# Patient Record
Sex: FEMALE | URBAN - METROPOLITAN AREA
[De-identification: names, ages, dates, MRNs, and addresses within clinical notes are randomized per-mention and may not be internally consistent; named-entity substitution may affect disease eponyms.]

---

## 2017-03-22 ENCOUNTER — OFFICE (OUTPATIENT)
Dept: URBAN - METROPOLITAN AREA CLINIC 90 | Facility: CLINIC | Age: 72
Setting detail: OPHTHALMOLOGY
End: 2017-03-22
Payer: MEDICARE

## 2017-03-22 ENCOUNTER — RX ONLY (RX ONLY)
Age: 72
End: 2017-03-22

## 2017-03-22 DIAGNOSIS — H16.423: ICD-10-CM

## 2017-03-22 DIAGNOSIS — H40.013: ICD-10-CM

## 2017-03-22 DIAGNOSIS — H35.3121: ICD-10-CM

## 2017-03-22 DIAGNOSIS — D31.32: ICD-10-CM

## 2017-03-22 DIAGNOSIS — H26.492: ICD-10-CM

## 2017-03-22 PROCEDURE — 92250 FUNDUS PHOTOGRAPHY W/I&R: CPT | Performed by: OPHTHALMOLOGY

## 2017-03-22 PROCEDURE — 92014 COMPRE OPH EXAM EST PT 1/>: CPT | Performed by: OPHTHALMOLOGY

## 2017-03-22 ASSESSMENT — REFRACTION_MANIFEST
OD_VA2: J1
OD_VA2: 20/
OD_VA2: 20/
OD_VA3: 20/
OS_SPHERE: -0.50
OS_VA1: 20/25+/-
OS_VA2: 20/
OS_VA3: 20/
OU_VA: 20/
OD_ADD: +2.50
OD_VA1: 20/
OS_VA3: 20/
OD_CYLINDER: -0.50
OU_VA: 20/
OD_VA1: 20/
OD_VA3: 20/
OS_VA2: 20/
OS_VA1: 20/
OD_VA3: 20/
OU_VA: 20/
OS_VA2: J1
OS_ADD: +2.50
OS_VA1: 20/
OS_AXIS: 025
OD_SPHERE: -0.25
OD_VA1: 20/20-1
OS_VA3: 20/
OS_CYLINDER: -1.00
OD_AXIS: 150

## 2017-03-22 ASSESSMENT — VISUAL ACUITY
OS_BCVA: 20/25-2
OD_BCVA: 20/30-2

## 2017-03-22 ASSESSMENT — REFRACTION_CURRENTRX
OD_OVR_VA: 20/
OS_OVR_VA: 20/
OD_OVR_VA: 20/
OS_OVR_VA: 20/
OS_OVR_VA: 20/
OD_OVR_VA: 20/

## 2017-03-22 ASSESSMENT — CONFRONTATIONAL VISUAL FIELD TEST (CVF)
OD_FINDINGS: FULL
OS_FINDINGS: FULL

## 2017-03-22 ASSESSMENT — SPHEQUIV_DERIVED
OD_SPHEQUIV: -0.5
OS_SPHEQUIV: -1
OD_SPHEQUIV: -0.25
OS_SPHEQUIV: -0.75

## 2017-03-22 ASSESSMENT — REFRACTION_AUTOREFRACTION
OD_SPHERE: 0.00
OD_CYLINDER: -0.50
OS_CYLINDER: -1.50
OD_AXIS: 144
OS_SPHERE: 0.00
OS_AXIS: 35

## 2017-03-22 ASSESSMENT — VASCULARIZATION
OD_VASCULARIZATION: NASAL PANNUS
OS_VASCULARIZATION: NASAL PANNUS

## 2017-09-27 ENCOUNTER — OFFICE (OUTPATIENT)
Dept: URBAN - METROPOLITAN AREA CLINIC 90 | Facility: CLINIC | Age: 72
Setting detail: OPHTHALMOLOGY
End: 2017-09-27
Payer: MEDICARE

## 2017-09-27 DIAGNOSIS — H40.013: ICD-10-CM

## 2017-09-27 DIAGNOSIS — H35.373: ICD-10-CM

## 2017-09-27 DIAGNOSIS — H35.3121: ICD-10-CM

## 2017-09-27 DIAGNOSIS — H26.492: ICD-10-CM

## 2017-09-27 DIAGNOSIS — D31.32: ICD-10-CM

## 2017-09-27 DIAGNOSIS — H16.423: ICD-10-CM

## 2017-09-27 PROCEDURE — 92014 COMPRE OPH EXAM EST PT 1/>: CPT | Performed by: OPHTHALMOLOGY

## 2017-09-27 ASSESSMENT — KERATOMETRY
OS_AXISANGLE_DEGREES: 109
OD_AXISANGLE_DEGREES: 087
OD_K1POWER_DIOPTERS: 42.75
OS_K2POWER_DIOPTERS: 45.25
METHOD_AUTO_MANUAL: AUTO
OD_K2POWER_DIOPTERS: 45.25
OS_K1POWER_DIOPTERS: 42.50

## 2017-09-27 ASSESSMENT — REFRACTION_AUTOREFRACTION
OD_CYLINDER: -0.50
OS_CYLINDER: -1.50
OD_AXIS: 162
OS_SPHERE: -0.25
OS_AXIS: 030
OD_SPHERE: -0.25

## 2017-09-27 ASSESSMENT — REFRACTION_MANIFEST
OD_AXIS: 150
OS_SPHERE: -0.50
OD_SPHERE: -0.25
OS_AXIS: 025
OU_VA: 20/
OS_VA1: 20/
OU_VA: 20/
OS_VA2: 20/
OU_VA: 20/
OD_VA1: 20/
OD_VA3: 20/
OS_VA3: 20/
OD_VA2: J1
OS_VA2: J1
OD_VA1: 20/20-1
OD_VA2: 20/
OD_VA1: 20/
OD_ADD: +2.50
OS_VA3: 20/
OS_ADD: +2.50
OS_VA3: 20/
OS_CYLINDER: -1.00
OD_VA2: 20/
OD_VA3: 20/
OS_VA2: 20/
OD_VA3: 20/
OS_VA1: 20/25+/-
OD_CYLINDER: -0.50
OS_VA1: 20/

## 2017-09-27 ASSESSMENT — VISUAL ACUITY
OS_BCVA: 20/25+2
OD_BCVA: 20/30

## 2017-09-27 ASSESSMENT — CONFRONTATIONAL VISUAL FIELD TEST (CVF)
OS_FINDINGS: FULL
OD_FINDINGS: FULL

## 2017-09-27 ASSESSMENT — REFRACTION_CURRENTRX
OS_OVR_VA: 20/
OS_OVR_VA: 20/
OD_OVR_VA: 20/
OS_OVR_VA: 20/
OD_OVR_VA: 20/
OD_OVR_VA: 20/

## 2017-09-27 ASSESSMENT — AXIALLENGTH_DERIVED
OS_AL: 23.8465
OS_AL: 23.8465
OD_AL: 23.6031
OD_AL: 23.6031

## 2017-09-27 ASSESSMENT — VASCULARIZATION
OD_VASCULARIZATION: NASAL PANNUS
OS_VASCULARIZATION: NASAL PANNUS

## 2017-09-27 ASSESSMENT — SPHEQUIV_DERIVED
OS_SPHEQUIV: -1
OD_SPHEQUIV: -0.5
OD_SPHEQUIV: -0.5
OS_SPHEQUIV: -1

## 2018-03-28 ENCOUNTER — OFFICE (OUTPATIENT)
Dept: URBAN - METROPOLITAN AREA CLINIC 90 | Facility: CLINIC | Age: 73
Setting detail: OPHTHALMOLOGY
End: 2018-03-28
Payer: MEDICARE

## 2018-03-28 DIAGNOSIS — H35.3121: ICD-10-CM

## 2018-03-28 DIAGNOSIS — H40.013: ICD-10-CM

## 2018-03-28 DIAGNOSIS — H26.492: ICD-10-CM

## 2018-03-28 DIAGNOSIS — H35.379: ICD-10-CM

## 2018-03-28 DIAGNOSIS — D31.32: ICD-10-CM

## 2018-03-28 DIAGNOSIS — H16.423: ICD-10-CM

## 2018-03-28 PROCEDURE — 92014 COMPRE OPH EXAM EST PT 1/>: CPT | Performed by: OPHTHALMOLOGY

## 2018-03-28 PROCEDURE — 92134 CPTRZ OPH DX IMG PST SGM RTA: CPT | Performed by: OPHTHALMOLOGY

## 2018-03-28 ASSESSMENT — VISUAL ACUITY
OD_BCVA: 20/40-2
OS_BCVA: 20/25-1

## 2018-03-28 ASSESSMENT — REFRACTION_MANIFEST
OD_VA1: 20/20-1
OS_CYLINDER: -1.00
OS_VA1: 20/
OD_CYLINDER: -0.50
OD_VA3: 20/
OD_VA1: 20/
OD_VA3: 20/
OD_AXIS: 150
OS_VA2: J1
OS_ADD: +2.50
OD_VA2: J1
OS_VA2: 20/
OU_VA: 20/
OS_VA3: 20/
OU_VA: 20/
OU_VA: 20/
OD_ADD: +2.50
OD_SPHERE: -0.25
OS_VA3: 20/
OS_VA1: 20/25+/-
OD_VA1: 20/
OD_VA2: 20/
OS_VA3: 20/
OS_VA1: 20/
OD_VA2: 20/
OS_AXIS: 025
OS_SPHERE: -0.50
OD_VA3: 20/
OS_VA2: 20/

## 2018-03-28 ASSESSMENT — KERATOMETRY
METHOD_AUTO_MANUAL: AUTO
OS_K2POWER_DIOPTERS: 45.75
OS_AXISANGLE_DEGREES: 109
OS_K1POWER_DIOPTERS: 42.75
OD_K2POWER_DIOPTERS: 45.25
OD_AXISANGLE_DEGREES: 080
OD_K1POWER_DIOPTERS: 43.00

## 2018-03-28 ASSESSMENT — VASCULARIZATION
OS_VASCULARIZATION: NASAL PANNUS
OD_VASCULARIZATION: NASAL PANNUS

## 2018-03-28 ASSESSMENT — REFRACTION_AUTOREFRACTION
OS_SPHERE: -0.25
OS_CYLINDER: -2.00
OD_CYLINDER: -0.50
OD_SPHERE: -0.25
OD_AXIS: 149
OS_AXIS: 029

## 2018-03-28 ASSESSMENT — REFRACTION_CURRENTRX
OS_OVR_VA: 20/
OD_OVR_VA: 20/
OS_OVR_VA: 20/
OD_OVR_VA: 20/
OS_OVR_VA: 20/
OD_OVR_VA: 20/

## 2018-03-28 ASSESSMENT — AXIALLENGTH_DERIVED
OD_AL: 23.5572
OD_AL: 23.5572
OS_AL: 23.8053
OS_AL: 23.7066

## 2018-03-28 ASSESSMENT — SPHEQUIV_DERIVED
OS_SPHEQUIV: -1.25
OS_SPHEQUIV: -1
OD_SPHEQUIV: -0.5
OD_SPHEQUIV: -0.5

## 2018-03-28 ASSESSMENT — CONFRONTATIONAL VISUAL FIELD TEST (CVF)
OD_FINDINGS: FULL
OS_FINDINGS: FULL

## 2018-09-24 ENCOUNTER — OFFICE (OUTPATIENT)
Dept: URBAN - METROPOLITAN AREA CLINIC 90 | Facility: CLINIC | Age: 73
Setting detail: OPHTHALMOLOGY
End: 2018-09-24
Payer: MEDICARE

## 2018-09-24 DIAGNOSIS — H26.492: ICD-10-CM

## 2018-09-24 DIAGNOSIS — H40.013: ICD-10-CM

## 2018-09-24 DIAGNOSIS — H35.373: ICD-10-CM

## 2018-09-24 DIAGNOSIS — H52.4: ICD-10-CM

## 2018-09-24 PROCEDURE — 92012 INTRM OPH EXAM EST PATIENT: CPT | Performed by: OPHTHALMOLOGY

## 2018-09-24 PROCEDURE — 92015 DETERMINE REFRACTIVE STATE: CPT | Performed by: OPHTHALMOLOGY

## 2018-09-24 ASSESSMENT — REFRACTION_AUTOREFRACTION
OD_AXIS: 156
OS_CYLINDER: -1.75
OS_SPHERE: -0.25
OD_CYLINDER: -0.75
OS_AXIS: 031
OD_SPHERE: -0.25

## 2018-09-24 ASSESSMENT — REFRACTION_OUTSIDERX
OD_AXIS: 150
OU_VA: 20/
OS_SPHERE: -0.50
OS_VA3: 20/
OD_VA1: 20/20-1
OD_ADD: +2.50
OS_VA1: 20/25+
OS_AXIS: 352
OS_ADD: +2.75
OS_CYLINDER: -1.25
OD_VA2: 20/20(J1+)
OD_VA3: 20/
OD_CYLINDER: -0.25
OD_SPHERE: -0.50
OS_VA2: 20/20(J1+)

## 2018-09-24 ASSESSMENT — REFRACTION_MANIFEST
OS_VA2: 20/
OS_VA1: 20/25+/-
OD_ADD: +2.50
OS_VA3: 20/
OD_VA1: 20/20-1
OU_VA: 20/
OD_CYLINDER: -0.50
OD_VA2: 20/
OS_SPHERE: -0.50
OD_VA2: J1
OS_ADD: +2.50
OD_SPHERE: -0.25
OD_AXIS: 150
OS_VA2: J1
OS_CYLINDER: -1.00
OD_VA1: 20/
OD_VA3: 20/
OS_AXIS: 025
OD_VA3: 20/
OS_VA3: 20/
OS_VA1: 20/
OU_VA: 20/

## 2018-09-24 ASSESSMENT — KERATOMETRY
OD_AXISANGLE_DEGREES: 082
OS_AXISANGLE_DEGREES: 113
OD_K1POWER_DIOPTERS: 43.00
OS_K2POWER_DIOPTERS: 45.50
OD_K2POWER_DIOPTERS: 45.25
OS_K1POWER_DIOPTERS: 42.50
METHOD_AUTO_MANUAL: AUTO

## 2018-09-24 ASSESSMENT — AXIALLENGTH_DERIVED
OS_AL: 23.7997
OD_AL: 23.6058
OS_AL: 23.8493
OD_AL: 23.5572

## 2018-09-24 ASSESSMENT — SPHEQUIV_DERIVED
OD_SPHEQUIV: -0.5
OS_SPHEQUIV: -1
OD_SPHEQUIV: -0.625
OS_SPHEQUIV: -1.125

## 2018-09-24 ASSESSMENT — REFRACTION_CURRENTRX
OD_OVR_VA: 20/
OD_OVR_VA: 20/
OS_OVR_VA: 20/
OS_OVR_VA: 20/
OD_OVR_VA: 20/
OS_OVR_VA: 20/

## 2018-09-24 ASSESSMENT — VISUAL ACUITY
OD_BCVA: 20/50
OS_BCVA: 20/25

## 2018-09-24 ASSESSMENT — CONFRONTATIONAL VISUAL FIELD TEST (CVF)
OD_FINDINGS: FULL
OS_FINDINGS: FULL

## 2018-09-24 ASSESSMENT — VASCULARIZATION
OS_VASCULARIZATION: NASAL PANNUS
OD_VASCULARIZATION: NASAL PANNUS

## 2019-03-25 ENCOUNTER — OFFICE (OUTPATIENT)
Dept: URBAN - METROPOLITAN AREA CLINIC 90 | Facility: CLINIC | Age: 74
Setting detail: OPHTHALMOLOGY
End: 2019-03-25
Payer: MEDICARE

## 2019-03-25 DIAGNOSIS — H16.423: ICD-10-CM

## 2019-03-25 DIAGNOSIS — H26.492: ICD-10-CM

## 2019-03-25 DIAGNOSIS — H35.363: ICD-10-CM

## 2019-03-25 DIAGNOSIS — H40.013: ICD-10-CM

## 2019-03-25 DIAGNOSIS — H35.379: ICD-10-CM

## 2019-03-25 DIAGNOSIS — D31.32: ICD-10-CM

## 2019-03-25 DIAGNOSIS — H35.3121: ICD-10-CM

## 2019-03-25 PROCEDURE — 92250 FUNDUS PHOTOGRAPHY W/I&R: CPT | Performed by: OPHTHALMOLOGY

## 2019-03-25 PROCEDURE — 92014 COMPRE OPH EXAM EST PT 1/>: CPT | Performed by: OPHTHALMOLOGY

## 2019-03-25 ASSESSMENT — REFRACTION_AUTOREFRACTION
OD_CYLINDER: -0.50
OD_AXIS: 162
OS_AXIS: 028
OD_SPHERE: -0.25
OS_SPHERE: -0.25
OS_CYLINDER: -2.00

## 2019-03-25 ASSESSMENT — CONFRONTATIONAL VISUAL FIELD TEST (CVF)
OD_FINDINGS: FULL
OS_FINDINGS: FULL

## 2019-03-25 ASSESSMENT — KERATOMETRY
OS_AXISANGLE_DEGREES: 112
OS_K2POWER_DIOPTERS: 45.25
OD_AXISANGLE_DEGREES: 083
OD_K1POWER_DIOPTERS: 42.75
METHOD_AUTO_MANUAL: AUTO
OD_K2POWER_DIOPTERS: 45.25
OS_K1POWER_DIOPTERS: 42.50

## 2019-03-25 ASSESSMENT — REFRACTION_MANIFEST
OD_ADD: +2.50
OD_ADD: +2.50
OS_CYLINDER: -1.25
OS_VA3: 20/
OD_VA3: 20/
OU_VA: 20/
OD_VA3: 20/
OD_CYLINDER: -0.50
OS_CYLINDER: -1.00
OS_ADD: +2.75
OD_VA1: 20/20-1
OS_VA1: 20/25+/-
OS_SPHERE: -0.50
OD_AXIS: 150
OD_VA2: 20/20(J1+)
OD_SPHERE: -0.50
OS_VA1: 20/25+
OS_SPHERE: -0.50
OD_VA1: 20/20-1
OS_VA2: J1
OU_VA: 20/
OS_ADD: +2.50
OS_VA2: 20/20(J1+)
OS_AXIS: 352
OD_VA2: J1
OS_AXIS: 025
OS_VA3: 20/
OD_CYLINDER: -0.25
OD_SPHERE: -0.25
OD_AXIS: 150

## 2019-03-25 ASSESSMENT — REFRACTION_CURRENTRX
OD_OVR_VA: 20/
OS_OVR_VA: 20/
OS_OVR_VA: 20/
OD_OVR_VA: 20/
OS_OVR_VA: 20/
OD_OVR_VA: 20/

## 2019-03-25 ASSESSMENT — AXIALLENGTH_DERIVED
OS_AL: 23.9464
OD_AL: 23.6031
OD_AL: 23.6031
OS_AL: 23.8963
OS_AL: 23.8465
OD_AL: 23.6519

## 2019-03-25 ASSESSMENT — SPHEQUIV_DERIVED
OS_SPHEQUIV: -1.25
OD_SPHEQUIV: -0.5
OD_SPHEQUIV: -0.625
OD_SPHEQUIV: -0.5
OS_SPHEQUIV: -1.125
OS_SPHEQUIV: -1

## 2019-03-25 ASSESSMENT — VASCULARIZATION
OS_VASCULARIZATION: NASAL PANNUS
OD_VASCULARIZATION: NASAL PANNUS

## 2019-03-25 ASSESSMENT — VISUAL ACUITY
OD_BCVA: 20/40
OS_BCVA: 20/20-1

## 2019-08-12 ENCOUNTER — EMERGENCY (EMERGENCY)
Facility: HOSPITAL | Age: 74
LOS: 1 days | Discharge: HOME | End: 2019-08-12
Attending: EMERGENCY MEDICINE | Admitting: EMERGENCY MEDICINE
Payer: MEDICARE

## 2019-08-12 VITALS
DIASTOLIC BLOOD PRESSURE: 76 MMHG | RESPIRATION RATE: 16 BRPM | HEART RATE: 81 BPM | SYSTOLIC BLOOD PRESSURE: 139 MMHG | OXYGEN SATURATION: 97 %

## 2019-08-12 VITALS
SYSTOLIC BLOOD PRESSURE: 157 MMHG | RESPIRATION RATE: 16 BRPM | HEART RATE: 91 BPM | HEIGHT: 64 IN | DIASTOLIC BLOOD PRESSURE: 92 MMHG | WEIGHT: 113.1 LBS | OXYGEN SATURATION: 100 %

## 2019-08-12 LAB
ALBUMIN SERPL ELPH-MCNC: 4.2 G/DL — SIGNIFICANT CHANGE UP (ref 3.3–5)
ALP SERPL-CCNC: 56 U/L — SIGNIFICANT CHANGE UP (ref 40–120)
ALT FLD-CCNC: 27 U/L — SIGNIFICANT CHANGE UP (ref 10–45)
ANION GAP SERPL CALC-SCNC: 11 MMOL/L — SIGNIFICANT CHANGE UP (ref 5–17)
AST SERPL-CCNC: 29 U/L — SIGNIFICANT CHANGE UP (ref 10–40)
BASOPHILS # BLD AUTO: 0 K/UL — SIGNIFICANT CHANGE UP (ref 0–0.2)
BASOPHILS NFR BLD AUTO: 0 % — SIGNIFICANT CHANGE UP (ref 0–2)
BILIRUB SERPL-MCNC: 0.4 MG/DL — SIGNIFICANT CHANGE UP (ref 0.2–1.2)
BUN SERPL-MCNC: 21 MG/DL — SIGNIFICANT CHANGE UP (ref 7–23)
CALCIUM SERPL-MCNC: 9.4 MG/DL — SIGNIFICANT CHANGE UP (ref 8.4–10.5)
CHLORIDE SERPL-SCNC: 98 MMOL/L — SIGNIFICANT CHANGE UP (ref 96–108)
CO2 SERPL-SCNC: 24 MMOL/L — SIGNIFICANT CHANGE UP (ref 22–31)
CREAT SERPL-MCNC: 0.55 MG/DL — SIGNIFICANT CHANGE UP (ref 0.5–1.3)
EOSINOPHIL # BLD AUTO: 0 K/UL — SIGNIFICANT CHANGE UP (ref 0–0.5)
EOSINOPHIL NFR BLD AUTO: 0.2 % — SIGNIFICANT CHANGE UP (ref 0–6)
GLUCOSE SERPL-MCNC: 141 MG/DL — HIGH (ref 70–99)
HCT VFR BLD CALC: 41.9 % — SIGNIFICANT CHANGE UP (ref 34.5–45)
HGB BLD-MCNC: 13.7 G/DL — SIGNIFICANT CHANGE UP (ref 11.5–15.5)
LYMPHOCYTES # BLD AUTO: 0.5 K/UL — LOW (ref 1–3.3)
LYMPHOCYTES # BLD AUTO: 4.4 % — LOW (ref 13–44)
MCHC RBC-ENTMCNC: 31 PG — SIGNIFICANT CHANGE UP (ref 27–34)
MCHC RBC-ENTMCNC: 32.8 GM/DL — SIGNIFICANT CHANGE UP (ref 32–36)
MCV RBC AUTO: 94.7 FL — SIGNIFICANT CHANGE UP (ref 80–100)
MONOCYTES # BLD AUTO: 0.5 K/UL — SIGNIFICANT CHANGE UP (ref 0–0.9)
MONOCYTES NFR BLD AUTO: 4.5 % — SIGNIFICANT CHANGE UP (ref 2–14)
NEUTROPHILS # BLD AUTO: 9.7 K/UL — HIGH (ref 1.8–7.4)
NEUTROPHILS NFR BLD AUTO: 91 % — HIGH (ref 43–77)
PLATELET # BLD AUTO: 252 K/UL — SIGNIFICANT CHANGE UP (ref 150–400)
POTASSIUM SERPL-MCNC: 4 MMOL/L — SIGNIFICANT CHANGE UP (ref 3.5–5.3)
POTASSIUM SERPL-SCNC: 4 MMOL/L — SIGNIFICANT CHANGE UP (ref 3.5–5.3)
PROT SERPL-MCNC: 6.8 G/DL — SIGNIFICANT CHANGE UP (ref 6–8.3)
RBC # BLD: 4.42 M/UL — SIGNIFICANT CHANGE UP (ref 3.8–5.2)
RBC # FLD: 11.6 % — SIGNIFICANT CHANGE UP (ref 10.3–14.5)
SODIUM SERPL-SCNC: 133 MMOL/L — LOW (ref 135–145)
WBC # BLD: 10.7 K/UL — HIGH (ref 3.8–10.5)
WBC # FLD AUTO: 10.7 K/UL — HIGH (ref 3.8–10.5)

## 2019-08-12 PROCEDURE — 72131 CT LUMBAR SPINE W/O DYE: CPT | Mod: 26

## 2019-08-12 PROCEDURE — 70450 CT HEAD/BRAIN W/O DYE: CPT

## 2019-08-12 PROCEDURE — 96374 THER/PROPH/DIAG INJ IV PUSH: CPT | Mod: XU

## 2019-08-12 PROCEDURE — 72125 CT NECK SPINE W/O DYE: CPT

## 2019-08-12 PROCEDURE — 99284 EMERGENCY DEPT VISIT MOD MDM: CPT | Mod: 25

## 2019-08-12 PROCEDURE — 12052 INTMD RPR FACE/MM 2.6-5.0 CM: CPT

## 2019-08-12 PROCEDURE — 70450 CT HEAD/BRAIN W/O DYE: CPT | Mod: 26

## 2019-08-12 PROCEDURE — 99284 EMERGENCY DEPT VISIT MOD MDM: CPT | Mod: GC

## 2019-08-12 PROCEDURE — 80053 COMPREHEN METABOLIC PANEL: CPT

## 2019-08-12 PROCEDURE — 72131 CT LUMBAR SPINE W/O DYE: CPT

## 2019-08-12 PROCEDURE — 85027 COMPLETE CBC AUTOMATED: CPT

## 2019-08-12 PROCEDURE — 72125 CT NECK SPINE W/O DYE: CPT | Mod: 26

## 2019-08-12 RX ORDER — ONDANSETRON 8 MG/1
1 TABLET, FILM COATED ORAL
Qty: 15 | Refills: 0
Start: 2019-08-12 | End: 2019-08-16

## 2019-08-12 RX ORDER — ONDANSETRON 8 MG/1
4 TABLET, FILM COATED ORAL ONCE
Refills: 0 | Status: COMPLETED | OUTPATIENT
Start: 2019-08-12 | End: 2019-08-12

## 2019-08-12 RX ORDER — SODIUM CHLORIDE 9 MG/ML
1000 INJECTION INTRAMUSCULAR; INTRAVENOUS; SUBCUTANEOUS ONCE
Refills: 0 | Status: COMPLETED | OUTPATIENT
Start: 2019-08-12 | End: 2019-08-12

## 2019-08-12 RX ORDER — ACETAMINOPHEN 500 MG
975 TABLET ORAL ONCE
Refills: 0 | Status: COMPLETED | OUTPATIENT
Start: 2019-08-12 | End: 2019-08-12

## 2019-08-12 RX ADMIN — ONDANSETRON 4 MILLIGRAM(S): 8 TABLET, FILM COATED ORAL at 14:35

## 2019-08-12 RX ADMIN — Medication 975 MILLIGRAM(S): at 15:36

## 2019-08-12 RX ADMIN — SODIUM CHLORIDE 1000 MILLILITER(S): 9 INJECTION INTRAMUSCULAR; INTRAVENOUS; SUBCUTANEOUS at 14:23

## 2019-08-12 NOTE — ED ADULT NURSE REASSESSMENT NOTE - NS ED NURSE REASSESS COMMENT FT1
received care of pt pt ambulated to br without assistance pt feels better pt requesting plastics for r eyebrow lac.
r eye brow lac sutured. pt tolerated well. pt refused TD

## 2019-08-12 NOTE — ED PROVIDER NOTE - CLINICAL SUMMARY MEDICAL DECISION MAKING FREE TEXT BOX
74F with hx of HTN, HLD presenting after a mechanical fall yesterday. Plan - CT head and neck, adacel, laceration repair.

## 2019-08-12 NOTE — CONSULT NOTE ADULT - SUBJECTIVE AND OBJECTIVE BOX
73 yo F s/p fall with right upper eyelid/brow laceration.  No LOC.  No visual changes.  Patient requested plastics for repair.        PMH/PSH: HTN, HLD, bilateral upper blepharoplasty, bilateral breast augmentation    NKDA    Meds: statin, anti-HTN, pcn for dental work    Soc: nonsmoker, RHD    Fam: noncontrib        ROS: as per HPi and ow neg        PE:    NAD    ALert    MMM    PERRL    RIght upper eyelid/brow with 3cm laceration    Full thickness with exposed muscle    Abrasion below approximately 1cm        A/P: 73 yo F s/p fall with right upper eyelid/brow laceration.        Repaired as per procedure note    Keep steri in place until falls off    Follow up 1 week ANUPAMN        Rina Rodriguez MD    Plastic Surgery

## 2019-08-12 NOTE — ED PROVIDER NOTE - NS ED ROS FT
GENERAL: no fever, chills  HEENT: no cough, congestion, odynophagia, dysphagia  CARDIAC: no chest pain, palpitations, lightheadedness  PULM: no dyspnea, wheezing   GI: no abdominal pain, nausea, vomiting, diarrhea, constipation, melena, hematochezia  : no urinary dysuria, frequency, incontinence, hematuria  NEURO: no headache, motor weakness, sensory changes  MSK: no joint or muscle pain  SKIN: + laceration  HEME: no active bleeding, bruising

## 2019-08-12 NOTE — ED ADULT NURSE NOTE - NSIMPLEMENTINTERV_GEN_ALL_ED
Implemented All Fall with Harm Risk Interventions:  [place] to call system. Call bell, personal items and telephone within reach. Instruct patient to call for assistance. Room bathroom lighting operational. Non-slip footwear when patient is off stretcher. Physically safe environment: no spills, clutter or unnecessary equipment. Stretcher in lowest position, wheels locked, appropriate side rails in place. Provide visual cue, wrist band, yellow gown, etc. Monitor gait and stability. Monitor for mental status changes and reorient to person, place, and time. Review medications for side effects contributing to fall risk. Reinforce activity limits and safety measures with patient and family. Provide visual clues: red socks.

## 2019-08-12 NOTE — ED PROVIDER NOTE - CARE PLAN
Principal Discharge DX:	Fall Principal Discharge DX:	Facial laceration, initial encounter  Secondary Diagnosis:	Lumbar back pain

## 2019-08-12 NOTE — ED ADULT NURSE NOTE - OBJECTIVE STATEMENT
74 y.o female pmh HTN and HLD presenting to ED from pcp office by EMS for head ct s/p slip and fall last night while using the bathroom and vomiting, no loc presenting to ED with R. brow laceration and c/o lower generalized back pain. pt states she began vomiting and having diarrhea last night and while using the bathroom pt states she "slipped" off of the toilet hurting her lower back and hitting her r. brow, denies passing out. states she went to her pcp today to get her brow and back checked out and for the vomiting and pt was directed to come to ED for further evaluation and work up and for head ct. pt c/o nausea and lower generalized back discomfort upon assessment. did not take anything for the pain or discomfort. VS stable. denies any numbness, tingling, weakness, dizziness, blurred vision, fevers, chills, sick contacts or recent travel. labs and line obtained. results pending. pt pending ct scan of head. safety and fall precautions maintained. call bell at the bedside and within reach.

## 2019-08-12 NOTE — ED PROVIDER NOTE - PROGRESS NOTE DETAILS
Tong: plastics consulted - will see patiently shortly to repair laceration. Tong: lac repaired. Patient stable for discharge.

## 2019-08-12 NOTE — ED PROVIDER NOTE - OBJECTIVE STATEMENT
74F with hx of HTN, HLD presenting after a mechanical fall yesterday. Patient slipped in the bathroom and hit her head. No LOC or amnesia. No headache, neck pain. No motor or sensory changes. No ataxia. No prodromal chest pain, dyspnea, palpitations. Has laceration over right eyebrow. Also complaining of lower back pain.

## 2019-08-12 NOTE — ED PROVIDER NOTE - ATTENDING CONTRIBUTION TO CARE
Patient presenting complaining of facial pain and back pain after fall last night.  Patient with nausea and vomiting last night.  During night while vomiting fell, striking face, also injuring lower back.  Able to stand and ambulate afterwards, no loss of consciousness associated with fall.  Nausea and vomiting now resolved, tolerating PO.  Today followed up with PMD who sent patient to Emergency Department for evaluation of possible traumatic injury.  Not on blood thinners.  No sensation changes.  Denying associated syncope, chest pains, palpitations, shortness of breath, numbness, tingling and weakness.    A 14 point review of systems is negative except as in HPI or otherwise documented.    Exam:  General: Patient well appearing, vital signs within normal limits  HEENT: airway patent with moist mucous membranes, laceration above R eyebrow, no other facial trauma appreciated  Cardiac: RRR S1/S2 with strong peripheral pulses  Respiratory: lungs clear without respiratory distress  GI: abdomen soft, non tender, non distended  Neuro: CN II-XII intact, normal strength, sensation and coordination  MSK: midline lumbar back pains with normal ROM, no extremity deformity appreciated  Skin: warm, well perfused  Psych: normal mood and affect    Patient with facial injury and back pains after fall yesterday, neurologically intact, plan for screening labs given preceding vomiting to r/o electrolyte abnormality or AVI secondary to fluid losses, imaging of head, c-spine and L-spine to r/o ICH or spinal fracture, lac repair.

## 2019-08-12 NOTE — ED PROVIDER NOTE - NSFOLLOWUPINSTRUCTIONS_ED_ALL_ED_FT
Your diagnosis:    Discharge instructions:    - Please follow up with your Primary Care Doctor.    - Tylenol up to 650 mg every 8 hours as needed for pain and/or Motrin up to 600 mg every 6 hours as needed for pain. Take any prescribed medications as instructed:     - Others:    - Be sure to return to the ED if you develop new or worsening symptoms. Specific signs and symptoms to be vigilant of: fever or chills, chest pain, difficulty breathing, palpitations, loss of consciousness, headache, vision changes, slurred speech, difficulty swallowing or drooling, facial droop, weakness in the arms or legs, numbness or tingling, abdominal pain, nausea or vomiting, diarrhea, constipation, blood in the stool or urine, pain on urination, difficulty urinating. Your diagnosis: fall    Discharge instructions:    - Please follow up with your doctor to check on your laceration repair within 5-7 days of discharge.    - Tylenol up to 650 mg every 8 hours as needed for pain and/or Motrin up to 600 mg every 6 hours as needed for pain. Take any prescribed medications as instructed: ZOFRAN 4 mg every 8 hours as needed for nausea.    - Others:    - Be sure to return to the ED if you develop new or worsening symptoms. Specific signs and symptoms to be vigilant of: fever or chills, chest pain, difficulty breathing, palpitations, loss of consciousness, headache, vision changes, slurred speech, difficulty swallowing or drooling, facial droop, weakness in the arms or legs, numbness or tingling, abdominal pain, nausea or vomiting, diarrhea, constipation, blood in the stool or urine, pain on urination, difficulty urinating.

## 2019-08-12 NOTE — ED PROVIDER NOTE - PHYSICAL EXAMINATION
GENERAL: no acute distress, non-toxic appearing  HEAD: normocephalic, atraumatic  HEENT: normal conjunctiva, oral mucosa moist, neck supple  CARDIAC: regular rate and rhythm, normal S1 and S2,  no appreciable murmurs  PULM: clear to ascultation bilaterally, no crackles, rales, rhonchi, or wheezing  GI: abdomen nondistended, soft, nontender, no guarding or rebound tenderness  NEURO: alert and oriented x 3, normal speech, PERRLA, EOMI, no focal motor or sensory deficits  MSK: no visible deformities, no peripheral edema, calf tenderness/redness/swelling  SKIN: 2 cm linear laceration over right eyebrow  PSYCH: appropriate mood and affect

## 2019-11-25 ENCOUNTER — OFFICE (OUTPATIENT)
Dept: URBAN - METROPOLITAN AREA CLINIC 90 | Facility: CLINIC | Age: 74
Setting detail: OPHTHALMOLOGY
End: 2019-11-25
Payer: MEDICARE

## 2019-11-25 DIAGNOSIS — H35.3121: ICD-10-CM

## 2019-11-25 DIAGNOSIS — H26.492: ICD-10-CM

## 2019-11-25 DIAGNOSIS — H40.013: ICD-10-CM

## 2019-11-25 DIAGNOSIS — H16.423: ICD-10-CM

## 2019-11-25 DIAGNOSIS — D31.32: ICD-10-CM

## 2019-11-25 DIAGNOSIS — H35.379: ICD-10-CM

## 2019-11-25 PROCEDURE — 92083 EXTENDED VISUAL FIELD XM: CPT | Performed by: OPHTHALMOLOGY

## 2019-11-25 PROCEDURE — 92133 CPTRZD OPH DX IMG PST SGM ON: CPT | Performed by: OPHTHALMOLOGY

## 2019-11-25 PROCEDURE — 92014 COMPRE OPH EXAM EST PT 1/>: CPT | Performed by: OPHTHALMOLOGY

## 2019-11-25 ASSESSMENT — VISUAL ACUITY
OD_BCVA: 20/60+2
OS_BCVA: 20/25-2

## 2019-11-25 ASSESSMENT — SPHEQUIV_DERIVED
OD_SPHEQUIV: -0.5
OS_SPHEQUIV: -1
OD_SPHEQUIV: -0.5
OS_SPHEQUIV: -1.125
OS_SPHEQUIV: -1.125
OD_SPHEQUIV: -0.625

## 2019-11-25 ASSESSMENT — KERATOMETRY
OS_K2POWER_DIOPTERS: 45.50
OD_K1POWER_DIOPTERS: 43.00
OD_AXISANGLE_DEGREES: 080
OS_AXISANGLE_DEGREES: 115
OD_K2POWER_DIOPTERS: 44.75
METHOD_AUTO_MANUAL: AUTO
OS_K1POWER_DIOPTERS: 42.50

## 2019-11-25 ASSESSMENT — REFRACTION_MANIFEST
OD_ADD: +2.50
OD_VA3: 20/
OD_ADD: +2.50
OS_VA3: 20/
OU_VA: 20/
OD_CYLINDER: -0.25
OS_VA1: 20/25+
OS_SPHERE: -0.50
OS_ADD: +2.50
OD_SPHERE: -0.50
OD_VA1: 20/20-1
OD_SPHERE: -0.25
OS_VA2: J1
OS_CYLINDER: -1.25
OD_VA2: J1
OD_VA2: 20/20(J1+)
OS_VA3: 20/
OD_VA1: 20/20-1
OS_VA2: 20/20(J1+)
OS_CYLINDER: -1.00
OS_VA1: 20/25+/-
OS_AXIS: 352
OS_AXIS: 025
OD_AXIS: 150
OD_AXIS: 150
OD_CYLINDER: -0.50
OD_VA3: 20/
OS_ADD: +2.75
OU_VA: 20/
OS_SPHERE: -0.50

## 2019-11-25 ASSESSMENT — REFRACTION_AUTOREFRACTION
OS_AXIS: 036
OD_SPHERE: -0.25
OD_CYLINDER: -0.50
OS_CYLINDER: -1.75
OD_AXIS: 141
OS_SPHERE: -0.25

## 2019-11-25 ASSESSMENT — CONFRONTATIONAL VISUAL FIELD TEST (CVF)
OD_FINDINGS: FULL
OS_FINDINGS: FULL

## 2019-11-25 ASSESSMENT — AXIALLENGTH_DERIVED
OD_AL: 23.6981
OS_AL: 23.8493
OD_AL: 23.6491
OD_AL: 23.6491
OS_AL: 23.7997
OS_AL: 23.8493

## 2019-11-25 ASSESSMENT — VASCULARIZATION
OS_VASCULARIZATION: NASAL PANNUS
OD_VASCULARIZATION: NASAL PANNUS

## 2020-02-05 ENCOUNTER — OFFICE (OUTPATIENT)
Dept: URBAN - METROPOLITAN AREA CLINIC 90 | Facility: CLINIC | Age: 75
Setting detail: OPHTHALMOLOGY
End: 2020-02-05
Payer: MEDICARE

## 2020-02-05 DIAGNOSIS — H40.013: ICD-10-CM

## 2020-02-05 DIAGNOSIS — H35.3121: ICD-10-CM

## 2020-02-05 DIAGNOSIS — H26.492: ICD-10-CM

## 2020-02-05 DIAGNOSIS — R25.3: ICD-10-CM

## 2020-02-05 DIAGNOSIS — H35.379: ICD-10-CM

## 2020-02-05 DIAGNOSIS — D31.32: ICD-10-CM

## 2020-02-05 PROCEDURE — 92250 FUNDUS PHOTOGRAPHY W/I&R: CPT | Performed by: OPHTHALMOLOGY

## 2020-02-05 PROCEDURE — 92014 COMPRE OPH EXAM EST PT 1/>: CPT | Performed by: OPHTHALMOLOGY

## 2020-02-05 ASSESSMENT — CONFRONTATIONAL VISUAL FIELD TEST (CVF)
OS_FINDINGS: FULL
OD_FINDINGS: FULL

## 2020-02-05 ASSESSMENT — AXIALLENGTH_DERIVED
OD_AL: 23.6519
OS_AL: 23.8465
OD_AL: 23.6519
OD_AL: 23.6031
OS_AL: 23.8963
OS_AL: 23.9967

## 2020-02-05 ASSESSMENT — REFRACTION_MANIFEST
OD_VA1: 20/20-1
OS_VA2: 20/20(J1+)
OD_ADD: +2.50
OS_CYLINDER: -1.00
OD_AXIS: 150
OD_ADD: +2.50
OS_ADD: +2.50
OS_VA2: J1
OD_VA1: 20/20-1
OS_ADD: +2.75
OS_VA1: 20/25+/-
OD_VA3: 20/
OD_VA2: J1
OS_VA1: 20/25+
OU_VA: 20/
OD_AXIS: 150
OS_VA3: 20/
OS_VA3: 20/
OD_VA3: 20/
OS_AXIS: 025
OD_VA2: 20/20(J1+)
OS_SPHERE: -0.50
OD_SPHERE: -0.50
OD_CYLINDER: -0.50
OS_SPHERE: -0.50
OD_SPHERE: -0.25
OS_AXIS: 352
OS_CYLINDER: -1.25
OD_CYLINDER: -0.25
OU_VA: 20/

## 2020-02-05 ASSESSMENT — VISUAL ACUITY
OS_BCVA: 20/20
OD_BCVA: 20/50-1

## 2020-02-05 ASSESSMENT — REFRACTION_AUTOREFRACTION
OD_AXIS: 147
OS_SPHERE: -0.50
OS_CYLINDER: -1.75
OD_SPHERE: -0.25
OD_CYLINDER: -0.75
OS_AXIS: 041

## 2020-02-05 ASSESSMENT — VASCULARIZATION: OD_VASCULARIZATION: NASAL PANNUS

## 2020-02-05 ASSESSMENT — KERATOMETRY
OS_K2POWER_DIOPTERS: 45.25
OD_K1POWER_DIOPTERS: 43.00
OD_AXISANGLE_DEGREES: 083
OD_K2POWER_DIOPTERS: 45.00
OS_AXISANGLE_DEGREES: 114
OS_K1POWER_DIOPTERS: 42.50
METHOD_AUTO_MANUAL: AUTO

## 2020-02-05 ASSESSMENT — SPHEQUIV_DERIVED
OS_SPHEQUIV: -1.375
OS_SPHEQUIV: -1
OD_SPHEQUIV: -0.625
OD_SPHEQUIV: -0.5
OD_SPHEQUIV: -0.625
OS_SPHEQUIV: -1.125

## 2020-08-24 ENCOUNTER — OFFICE (OUTPATIENT)
Dept: URBAN - METROPOLITAN AREA CLINIC 90 | Facility: CLINIC | Age: 75
Setting detail: OPHTHALMOLOGY
End: 2020-08-24
Payer: MEDICARE

## 2020-08-24 DIAGNOSIS — R25.3: ICD-10-CM

## 2020-08-24 DIAGNOSIS — H43.392: ICD-10-CM

## 2020-08-24 DIAGNOSIS — H43.813: ICD-10-CM

## 2020-08-24 DIAGNOSIS — H26.492: ICD-10-CM

## 2020-08-24 DIAGNOSIS — H35.373: ICD-10-CM

## 2020-08-24 DIAGNOSIS — H35.3121: ICD-10-CM

## 2020-08-24 DIAGNOSIS — H16.421: ICD-10-CM

## 2020-08-24 DIAGNOSIS — H40.013: ICD-10-CM

## 2020-08-24 DIAGNOSIS — D31.32: ICD-10-CM

## 2020-08-24 PROCEDURE — 92133 CPTRZD OPH DX IMG PST SGM ON: CPT | Performed by: OPHTHALMOLOGY

## 2020-08-24 PROCEDURE — 92014 COMPRE OPH EXAM EST PT 1/>: CPT | Performed by: OPHTHALMOLOGY

## 2020-08-24 PROCEDURE — 92083 EXTENDED VISUAL FIELD XM: CPT | Performed by: OPHTHALMOLOGY

## 2020-08-24 ASSESSMENT — REFRACTION_AUTOREFRACTION
OS_SPHERE: -0.25
OD_CYLINDER: -0.75
OD_SPHERE: -0.25
OS_AXIS: 036
OD_AXIS: 151
OS_CYLINDER: -2.00

## 2020-08-24 ASSESSMENT — KERATOMETRY
OD_K2POWER_DIOPTERS: 45.50
METHOD_AUTO_MANUAL: AUTO
OD_K1POWER_DIOPTERS: 43.00
OS_K1POWER_DIOPTERS: 42.75
OD_AXISANGLE_DEGREES: 082
OS_AXISANGLE_DEGREES: 112
OS_K2POWER_DIOPTERS: 45.50

## 2020-08-24 ASSESSMENT — REFRACTION_MANIFEST
OS_VA2: 20/20(J1+)
OS_ADD: +2.50
OD_ADD: +2.50
OD_CYLINDER: -0.25
OD_VA2: J1
OS_SPHERE: -0.50
OD_VA1: 20/20-1
OD_ADD: +2.50
OS_ADD: +2.75
OD_AXIS: 150
OD_VA1: 20/20-1
OS_SPHERE: -0.50
OS_VA1: 20/25+/-
OS_VA2: J1
OD_SPHERE: -0.25
OS_VA1: 20/25+
OD_SPHERE: -0.50
OS_AXIS: 352
OD_VA2: 20/20(J1+)
OS_CYLINDER: -1.00
OS_AXIS: 025
OS_CYLINDER: -1.25
OD_CYLINDER: -0.50
OD_AXIS: 150

## 2020-08-24 ASSESSMENT — AXIALLENGTH_DERIVED
OS_AL: 23.753
OD_AL: 23.5115
OS_AL: 23.8522
OD_AL: 23.56
OD_AL: 23.56
OS_AL: 23.8025

## 2020-08-24 ASSESSMENT — SPHEQUIV_DERIVED
OS_SPHEQUIV: -1
OS_SPHEQUIV: -1.25
OD_SPHEQUIV: -0.625
OD_SPHEQUIV: -0.625
OS_SPHEQUIV: -1.125
OD_SPHEQUIV: -0.5

## 2020-08-24 ASSESSMENT — CONFRONTATIONAL VISUAL FIELD TEST (CVF)
OD_FINDINGS: FULL
OS_FINDINGS: FULL

## 2020-08-24 ASSESSMENT — VASCULARIZATION: OD_VASCULARIZATION: NASAL PANNUS

## 2020-08-24 ASSESSMENT — TONOMETRY: OS_IOP_MMHG: 12

## 2020-08-24 ASSESSMENT — VISUAL ACUITY
OD_BCVA: 20/40-2
OS_BCVA: 20/20-2

## 2020-11-10 ENCOUNTER — APPOINTMENT (OUTPATIENT)
Dept: ORTHOPEDIC SURGERY | Facility: CLINIC | Age: 75
End: 2020-11-10
Payer: MEDICARE

## 2020-11-10 VITALS
SYSTOLIC BLOOD PRESSURE: 169 MMHG | HEIGHT: 63 IN | DIASTOLIC BLOOD PRESSURE: 84 MMHG | OXYGEN SATURATION: 96 % | HEART RATE: 82 BPM | BODY MASS INDEX: 18.61 KG/M2 | WEIGHT: 105 LBS

## 2020-11-10 DIAGNOSIS — M25.571 PAIN IN RIGHT ANKLE AND JOINTS OF RIGHT FOOT: ICD-10-CM

## 2020-11-10 DIAGNOSIS — S93.491A SPRAIN OF OTHER LIGAMENT OF RIGHT ANKLE, INITIAL ENCOUNTER: ICD-10-CM

## 2020-11-10 PROCEDURE — 73610 X-RAY EXAM OF ANKLE: CPT | Mod: RT

## 2020-11-10 PROCEDURE — 99214 OFFICE O/P EST MOD 30 MIN: CPT

## 2020-11-10 NOTE — HISTORY OF PRESENT ILLNESS
[Sneakers] : sariah [FreeTextEntry1] : Pt presents for follow up visit with pain in her right ankle, pt evidently pt fell at home tripped over electric cord on 11/3/2020 and twisted her right ankle. Pt took Tylenol for pain with no real relief. Pt does not take Advil because she takes blood pressure medication., Pt right ankle is swollen and ecchymosis is noted.

## 2020-11-10 NOTE — DISCUSSION/SUMMARY
[de-identified] : Patient was advised of her findings.  She was placed in an ankle stirrup brace over a compression wrap and will modify her activity level accordingly she will ice the area and elevate her leg.  Follow-up in 2 weeks to check her progress.

## 2020-11-10 NOTE — PHYSICAL EXAM
[de-identified] : Physical examination of the right ankle discloses medial and lateral joint swelling with moderate ecchymosis.  Tenderness is noted on stress inversion. [de-identified] : X-rays taken of the right ankle in AP lateral and internal oblique projections are negative for fracture.  Ankle mortise remains closed

## 2020-11-10 NOTE — ED ADULT NURSE NOTE - NEURO ASSESSMENT
RN faxed a request of Ultrasound and PSA results from Dr Rudolph Rose.  Fax [phone]. [address]  [place]  [phone] [facility]

## 2020-12-04 ENCOUNTER — APPOINTMENT (OUTPATIENT)
Dept: ORTHOPEDIC SURGERY | Facility: CLINIC | Age: 75
End: 2020-12-04
Payer: MEDICARE

## 2020-12-04 VITALS — HEIGHT: 63 IN | HEART RATE: 76 BPM | BODY MASS INDEX: 18.61 KG/M2 | OXYGEN SATURATION: 97 % | WEIGHT: 105 LBS

## 2020-12-04 VITALS — SYSTOLIC BLOOD PRESSURE: 191 MMHG | DIASTOLIC BLOOD PRESSURE: 93 MMHG

## 2020-12-04 DIAGNOSIS — S93.491D SPRAIN OF OTHER LIGAMENT OF RIGHT ANKLE, SUBSEQUENT ENCOUNTER: ICD-10-CM

## 2020-12-04 PROCEDURE — 99214 OFFICE O/P EST MOD 30 MIN: CPT | Mod: 25

## 2020-12-04 PROCEDURE — 20611 DRAIN/INJ JOINT/BURSA W/US: CPT | Mod: LT

## 2020-12-04 NOTE — REVIEW OF SYSTEMS
[Arthralgia] : arthralgia [Joint Stiffness] : joint stiffness [Joint Swelling] : joint swelling [Negative] : Heme/Lymph

## 2020-12-04 NOTE — HISTORY OF PRESENT ILLNESS
[Sneakers] : sariah [FreeTextEntry1] : Pt returns for follow up for pain in her right ankle, pt is wearing a compression wrap, she is not wearing the ankle stirrup presently. pt is taking Tylenol for pain prn with  no real relief. Original injury sustained on 11/3/2020 Patient is also noting further pain discomfort to her left knee. Symptoms are worse with weightbearing activities.

## 2020-12-04 NOTE — PROCEDURE
[FreeTextEntry1] : Under sterile technique the left knee was injected with cortisone 40 mg Depo-Medrol with lidocaine\par \par A discussion took place with the patient regarding the procedure. General risks and benefits were reviewed.\par The suprapatellar region of the knee was prepped to attain a sterile field. Ethyl Chloride spray was used as a topical anesthetic. \par A 22-gauge 1-1/2 inch needle was used to inject the medication.\par The procedure was performed utilizing ultrasound guided needle placement with the Sonosite linear transducer in order to visualize and confirm the location of the needle tip and intra-articular delivery of the medication in the exact location desired to achieve maximal benefit from the medication. The images were recorded and saved.\par The injection site was sterilely dressed and the patient tolerated the procedure well, without complications.\par The patient was given post injection instructions including rest, cool pack applications, and take NSAIDs or acetaminophen. The patient was advised that if there are worsening symptoms or any associated problems, to  contact our office accordingly

## 2020-12-04 NOTE — DISCUSSION/SUMMARY
[de-identified] : The patientwill continue activity level at tolerance and will use an Ace wrap for her right ankle As far as her left knee is concerned, following her cortisone injection today the patient will rest and use ice. She may return in 3-4 weeks for consideration of a PRP injection if symptoms persist.

## 2020-12-04 NOTE — PHYSICAL EXAM
[de-identified] : Physical examination of the right ankle discloses mild soft tissue swelling and tenderness of the lateral malleolar region. Range of motion functionally intact. No acutesigns of instability or neurovascular deficits. \par examination of the left knee discloses tenderness to the medial compartment with mild crepitus

## 2021-02-26 ENCOUNTER — APPOINTMENT (OUTPATIENT)
Dept: ORTHOPEDIC SURGERY | Facility: CLINIC | Age: 76
End: 2021-02-26
Payer: MEDICARE

## 2021-02-26 VITALS
WEIGHT: 107 LBS | BODY MASS INDEX: 18.96 KG/M2 | HEART RATE: 75 BPM | OXYGEN SATURATION: 95 % | SYSTOLIC BLOOD PRESSURE: 159 MMHG | HEIGHT: 63 IN | DIASTOLIC BLOOD PRESSURE: 89 MMHG

## 2021-02-26 DIAGNOSIS — M76.51 PATELLAR TENDINITIS, RIGHT KNEE: ICD-10-CM

## 2021-02-26 PROCEDURE — 99213 OFFICE O/P EST LOW 20 MIN: CPT

## 2021-02-26 PROCEDURE — 73564 X-RAY EXAM KNEE 4 OR MORE: CPT | Mod: RT

## 2021-02-26 NOTE — DISCUSSION/SUMMARY
[de-identified] : The patient was advised of her findings and will be referred to physical therapy take OTC NSAIDs rest and use cryotherapy.  Follow-up as needed

## 2021-02-26 NOTE — HISTORY OF PRESENT ILLNESS
[Worsening] : worsening [___ yrs] : [unfilled] year(s) ago [0] : a minimum pain level of 0/10 [4] : a maximum pain level of 4/10 [Intermit.] : ~He/She~ states the symptoms seem to be intermittent [Walking] : worsened by walking [Knee Flexion] : worsened with knee flexion [Ice] : relieved by ice [Rest] : relieved by rest [de-identified] : Pt presents for follow up visit, today pt is having pain in her right knee, pt does not recall any specific injury, she says she has been " walking a lot".  There is no buckling, occasional clicking. There is no edema noted.  [de-identified] : certain movements [de-identified] : Tylenol

## 2021-02-26 NOTE — PHYSICAL EXAM
[de-identified] : Physical examination of the right knee discloses tenderness to palpation at the superior pole of the patella midline.  Increased pain with resisted knee extension.  Range of motion to the right knee otherwise stable and nontender from 0 to 125 degrees flexion. [de-identified] : X-rays taken of the right knee and AP lateral skyline and open notch views disclose mild degenerative arthritic changes with periarticular patellofemoral osteophytes.

## 2021-04-05 ENCOUNTER — OFFICE (OUTPATIENT)
Dept: URBAN - METROPOLITAN AREA CLINIC 90 | Facility: CLINIC | Age: 76
Setting detail: OPHTHALMOLOGY
End: 2021-04-05
Payer: MEDICARE

## 2021-04-05 DIAGNOSIS — H35.373: ICD-10-CM

## 2021-04-05 DIAGNOSIS — D31.32: ICD-10-CM

## 2021-04-05 DIAGNOSIS — H16.421: ICD-10-CM

## 2021-04-05 DIAGNOSIS — H26.493: ICD-10-CM

## 2021-04-05 DIAGNOSIS — H43.813: ICD-10-CM

## 2021-04-05 DIAGNOSIS — H35.3121: ICD-10-CM

## 2021-04-05 DIAGNOSIS — H40.013: ICD-10-CM

## 2021-04-05 DIAGNOSIS — H18.452: ICD-10-CM

## 2021-04-05 DIAGNOSIS — H43.392: ICD-10-CM

## 2021-04-05 PROCEDURE — 92014 COMPRE OPH EXAM EST PT 1/>: CPT | Performed by: OPHTHALMOLOGY

## 2021-04-05 PROCEDURE — 92250 FUNDUS PHOTOGRAPHY W/I&R: CPT | Performed by: OPHTHALMOLOGY

## 2021-04-05 ASSESSMENT — TONOMETRY
OD_IOP_MMHG: 14
OS_IOP_MMHG: 14

## 2021-04-05 ASSESSMENT — VISUAL ACUITY
OD_BCVA: 20/40-2
OS_BCVA: 20/25+2

## 2021-04-05 ASSESSMENT — KERATOMETRY
OD_K1POWER_DIOPTERS: 42.75
OS_K2POWER_DIOPTERS: 45.75
METHOD_AUTO_MANUAL: AUTO
OS_AXISANGLE_DEGREES: 111
OS_K1POWER_DIOPTERS: 42.50
OD_K2POWER_DIOPTERS: 45.75
OD_AXISANGLE_DEGREES: 079

## 2021-04-05 ASSESSMENT — SPHEQUIV_DERIVED
OD_SPHEQUIV: -0.5
OD_SPHEQUIV: -0.625
OS_SPHEQUIV: -1.125
OS_SPHEQUIV: -1
OD_SPHEQUIV: -0.625
OS_SPHEQUIV: -1.75

## 2021-04-05 ASSESSMENT — AXIALLENGTH_DERIVED
OS_AL: 23.753
OD_AL: 23.56
OD_AL: 23.56
OS_AL: 24.053
OD_AL: 23.5115
OS_AL: 23.8025

## 2021-04-05 ASSESSMENT — VASCULARIZATION: OD_VASCULARIZATION: NASAL PANNUS

## 2021-04-05 ASSESSMENT — REFRACTION_MANIFEST
OD_VA2: 20/20(J1+)
OD_AXIS: 150
OS_ADD: +2.50
OD_VA2: J1
OS_AXIS: 025
OS_VA2: 20/20(J1+)
OD_SPHERE: -0.50
OD_VA1: 20/20-1
OS_AXIS: 352
OS_SPHERE: -0.50
OD_ADD: +2.50
OS_CYLINDER: -1.25
OS_VA1: 20/25+
OD_CYLINDER: -0.25
OD_CYLINDER: -0.50
OD_AXIS: 150
OD_ADD: +2.50
OD_SPHERE: -0.25
OS_SPHERE: -0.50
OS_ADD: +2.75
OD_VA1: 20/20-1
OS_CYLINDER: -1.00
OS_VA1: 20/25+/-
OS_VA2: J1

## 2021-04-05 ASSESSMENT — CONFRONTATIONAL VISUAL FIELD TEST (CVF)
OD_FINDINGS: FULL
OS_FINDINGS: FULL

## 2021-04-05 ASSESSMENT — REFRACTION_AUTOREFRACTION
OD_SPHERE: -0.25
OS_SPHERE: -0.75
OD_AXIS: 149
OS_CYLINDER: -2.00
OS_AXIS: 028
OD_CYLINDER: -0.75

## 2021-06-01 ENCOUNTER — APPOINTMENT (OUTPATIENT)
Dept: ORTHOPEDIC SURGERY | Facility: CLINIC | Age: 76
End: 2021-06-01
Payer: MEDICARE

## 2021-06-01 VITALS
OXYGEN SATURATION: 95 % | SYSTOLIC BLOOD PRESSURE: 171 MMHG | DIASTOLIC BLOOD PRESSURE: 84 MMHG | WEIGHT: 107 LBS | HEIGHT: 63 IN | BODY MASS INDEX: 18.96 KG/M2 | HEART RATE: 74 BPM

## 2021-06-01 PROCEDURE — 20611 DRAIN/INJ JOINT/BURSA W/US: CPT | Mod: LT

## 2021-06-01 PROCEDURE — 99213 OFFICE O/P EST LOW 20 MIN: CPT | Mod: 25

## 2021-06-01 RX ORDER — AMLODIPINE AND VALSARTAN 5; 320 MG/1; MG/1
5-320 TABLET, FILM COATED ORAL
Qty: 30 | Refills: 0 | Status: ACTIVE | COMMUNITY
Start: 2021-05-21

## 2021-06-01 RX ORDER — VALSARTAN AND HYDROCHLOROTHIAZIDE 160; 25 MG/1; MG/1
160-25 TABLET, FILM COATED ORAL
Qty: 90 | Refills: 0 | Status: ACTIVE | COMMUNITY
Start: 2021-01-19

## 2021-06-01 RX ORDER — VALSARTAN 80 MG/1
80 TABLET, COATED ORAL
Qty: 90 | Refills: 0 | Status: ACTIVE | COMMUNITY
Start: 2020-12-08

## 2021-06-01 RX ORDER — DOXYCYCLINE HYCLATE 20 MG/1
20 TABLET ORAL
Qty: 180 | Refills: 0 | Status: ACTIVE | COMMUNITY
Start: 2021-05-18

## 2021-06-01 RX ORDER — CHLORHEXIDINE GLUCONATE, 0.12% ORAL RINSE 1.2 MG/ML
0.12 SOLUTION DENTAL
Qty: 473 | Refills: 0 | Status: ACTIVE | COMMUNITY
Start: 2021-03-25

## 2021-06-01 RX ORDER — AMLODIPINE AND VALSARTAN 5; 160 MG/1; MG/1
5-160 TABLET, FILM COATED ORAL
Qty: 90 | Refills: 0 | Status: ACTIVE | COMMUNITY
Start: 2021-04-21

## 2021-06-01 NOTE — PHYSICAL EXAM
[de-identified] : Physical examination of the left knee discloses medial sided joint line tenderness to palpation.  She also experiencing discomfort to the posterior fossa functional range of motion intact.  Mild left knee effusion.

## 2021-06-01 NOTE — PROCEDURE
[de-identified] : Under sterile technique using ultrasound-guided needle placement the left knee was injected with Depo-Medrol 40 mg with lidocaine.\par \par A discussion took place with the patient regarding the procedure. General risks and benefits were reviewed.\par The suprapatellar region of the knee was prepped to attain a sterile field. Ethyl Chloride spray was used as a topical anesthetic. \par A 22-gauge 1-1/2 inch needle was used to inject the medication.\par The procedure was performed utilizing ultrasound guided needle placement with the Sonosite linear transducer in order to visualize and confirm the location of the needle tip and intra-articular delivery of the medication in the exact location desired to achieve maximal benefit from the medication. The images were recorded and saved.\par The injection site was sterilely dressed and the patient tolerated the procedure well, without complications.\par The patient was given post injection instructions including rest, cool pack applications, and take NSAIDs or acetaminophen. The patient was advised that if there are worsening symptoms or any associated problems, to contact our office accordingly

## 2021-06-01 NOTE — HISTORY OF PRESENT ILLNESS
[Worsening] : worsening [___ yrs] : [unfilled] year(s) ago [5] : a minimum pain level of 5/10 [6] : a maximum pain level of 6/10 [Walking] : worsened by walking [Knee Flexion] : worsened with knee flexion [Heat] : relieved by heat [Ice] : relieved by ice [Physical Therapy] : relieved by physical therapy [Rest] : relieved by rest [de-identified] : Pt returns for follow up for her bilateral knee pain, left knee worse. Pt is attending physical therapy weekly.  Pt states there is no snapping clicking buckling. Pt has  taken Tylenol prn for pain,  Pt states she is walking a mile a day. Pt states she "would like an injection to the left knee" Hx reveals injection to the left knee 12/2020 [de-identified] : certain movements, stairs [de-identified] : tylenol

## 2021-06-01 NOTE — DISCUSSION/SUMMARY
[de-identified] : Following the patient's cortisone injection to her left knee she will rest use ice take acetaminophen as needed and follow-up accordingly.

## 2021-06-28 ENCOUNTER — APPOINTMENT (OUTPATIENT)
Dept: ORTHOPEDIC SURGERY | Facility: CLINIC | Age: 76
End: 2021-06-28
Payer: MEDICARE

## 2021-06-28 VITALS
WEIGHT: 103 LBS | HEIGHT: 63 IN | SYSTOLIC BLOOD PRESSURE: 143 MMHG | HEART RATE: 83 BPM | DIASTOLIC BLOOD PRESSURE: 79 MMHG | BODY MASS INDEX: 18.25 KG/M2

## 2021-06-28 DIAGNOSIS — Z78.9 OTHER SPECIFIED HEALTH STATUS: ICD-10-CM

## 2021-06-28 DIAGNOSIS — Z86.79 PERSONAL HISTORY OF OTHER DISEASES OF THE CIRCULATORY SYSTEM: ICD-10-CM

## 2021-06-28 DIAGNOSIS — Z82.49 FAMILY HISTORY OF ISCHEMIC HEART DISEASE AND OTHER DISEASES OF THE CIRCULATORY SYSTEM: ICD-10-CM

## 2021-06-28 PROCEDURE — 72070 X-RAY EXAM THORAC SPINE 2VWS: CPT

## 2021-06-28 PROCEDURE — 72110 X-RAY EXAM L-2 SPINE 4/>VWS: CPT

## 2021-06-28 PROCEDURE — 99213 OFFICE O/P EST LOW 20 MIN: CPT

## 2021-06-28 NOTE — HISTORY OF PRESENT ILLNESS
[de-identified] : This is a 76-year-old female here today for evaluation of her low back pain.  She denies any radicular pain down her legs.  She denies any weakness.  She has been dealing with this right-sided low back pain for 2+ years.  She denies any bowel bladder issues.  She denies any saddle anesthesia.

## 2021-06-28 NOTE — ASSESSMENT
[FreeTextEntry1] : This is a 76-year-old female with a history of osteoarthritis and osteoporosis who presents today with low back pain.  She has an MRI from 2020 which does show spinal stenosis at L4-L5.  Furthermore she does have spondylolisthesis at L4-L5.  Currently however the patient would like to continue to pursue conservative management.  Given the fact that she has no red flag findings on her clinical exam I do think that she will do well with a course of physical therapy and Tylenol.  I will have her follow-up in 6 to 8 weeks for repeat clinical evaluation.  Encouraged her to follow-up sooner if she has any new or worsening symptoms.

## 2021-06-28 NOTE — PHYSICAL EXAM
[de-identified] : Lumbar Physical Exam\par \par Gait - Normal\par \par Station - Normal\par \par Sagittal balance - Normal\par \par Compensatory mechanism? - None\par \par Heel walk - Normal\par \par Toe walk - Normal\par \par Reflexes\par Patellar - normal\par Gastroc - normal\par Clonus - No\par \par Hip Exam - Normal\par \par Straight leg raise - none\par \par Pulses - 2+ dp/pt\par \par Range of motion - normal\par \par Sensation \par Sensation intact to light touch in L1, L2, L3, L4, L5 and S1 dermatomes bilaterally\par \par Motor\par 	IP	Quad	HS	TA	Gastroc	EHL\par Right	5/5	5/5	5/5	5/5	5/5	5/5\par Left	5/5	5/5	5/5	5/5	5/5	5/5 [de-identified] : Lumbar radiographs\par L4-L5 spondylolisthesis noted\par Facet arthropathy noted\par Disc degeneration\par Degenerative scoliosis noted\par \par Thoracic radiographs\par Thoracic spondylosis noted

## 2021-10-25 ENCOUNTER — APPOINTMENT (OUTPATIENT)
Dept: ORTHOPEDIC SURGERY | Facility: CLINIC | Age: 76
End: 2021-10-25
Payer: MEDICARE

## 2021-10-25 VITALS
SYSTOLIC BLOOD PRESSURE: 151 MMHG | OXYGEN SATURATION: 98 % | WEIGHT: 103 LBS | BODY MASS INDEX: 18.25 KG/M2 | DIASTOLIC BLOOD PRESSURE: 84 MMHG | HEIGHT: 63 IN | HEART RATE: 62 BPM

## 2021-10-25 PROCEDURE — 99214 OFFICE O/P EST MOD 30 MIN: CPT

## 2021-10-25 NOTE — PHYSICAL EXAM
[de-identified] : Lumbar Physical Exam\par \par Gait - Normal\par \par Station - Normal\par \par Sagittal balance - Normal\par \par Compensatory mechanism? - None\par \par Heel walk - Normal\par \par Toe walk - Normal\par \par Reflexes\par Patellar - normal\par Gastroc - normal\par Clonus - No\par \par Hip Exam - Normal\par \par Straight leg raise - none\par \par Pulses - 2+ dp/pt\par \par Range of motion - normal\par \par Sensation \par Sensation intact to light touch in L1, L2, L3, L4, L5 and S1 dermatomes bilaterally\par \par Motor\par 	IP	Quad	HS	TA	Gastroc	EHL\par Right	5/5	5/5	5/5	5/5	5/5	5/5\par Left	5/5	5/5	5/5	5/5	5/5	5/5 [de-identified] : Lumbar radiographs\par L4-L5 spondylolisthesis noted\par Facet arthropathy noted\par Disc degeneration\par Degenerative scoliosis noted\par \par Thoracic radiographs\par Thoracic spondylosis noted

## 2021-10-25 NOTE — ASSESSMENT
[FreeTextEntry1] : I had a lengthy discussion with the patient in regards to treatment plan and diagnosis. There are no red flag findings on imaging nor are there any red flag findings on clinical exam.  Therefore we will proceed with a course of conservative treatment.  This would include physical therapy/home exercise program, Tylenol, NSAIDs as medically indicated.  The patient will follow up with me in approximately 6 to 8 weeks.  I encouraged the patient to follow-up sooner if there are any new or worsening symptoms.\par \par At her next visit she would like to have a new set of x-rays which I think is reasonable.  She may be a candidate for an injection at her next visit as well.

## 2021-10-25 NOTE — HISTORY OF PRESENT ILLNESS
[Stable] : stable [___ yrs] : [unfilled] year(s) ago [2] : a current pain level of 2/10 [8] : a maximum pain level of 8/10 [Sitting] : sitting [Daily] : ~He/She~ states the symptoms seem to be occuring daily [de-identified] : 76 year old female patient presents with complains of chronic lower back pain with no radiation. Patient reports she has been experiencing this pain for 2+ years. Patient reports experiencing pain with position changes from a sitting to a standing position. Patient denies pain with walking and is able to walk 15+ blocks with no pain. Patient denies taking any prescribed or OTC medications at this time for the pain. Patient denies any weakness in the legs or instability. Patient denies any bowel or bladder issues. \par \par  [Ataxia] : no ataxia [Incontinence] : no incontinence [Urinary Ret.] : no urinary retention

## 2021-10-27 ENCOUNTER — OFFICE (OUTPATIENT)
Dept: URBAN - METROPOLITAN AREA CLINIC 90 | Facility: CLINIC | Age: 76
Setting detail: OPHTHALMOLOGY
End: 2021-10-27
Payer: MEDICARE

## 2021-10-27 DIAGNOSIS — D31.32: ICD-10-CM

## 2021-10-27 DIAGNOSIS — H26.493: ICD-10-CM

## 2021-10-27 DIAGNOSIS — H18.452: ICD-10-CM

## 2021-10-27 DIAGNOSIS — H35.373: ICD-10-CM

## 2021-10-27 DIAGNOSIS — H40.013: ICD-10-CM

## 2021-10-27 DIAGNOSIS — H43.392: ICD-10-CM

## 2021-10-27 DIAGNOSIS — H16.421: ICD-10-CM

## 2021-10-27 DIAGNOSIS — H35.3121: ICD-10-CM

## 2021-10-27 DIAGNOSIS — H43.813: ICD-10-CM

## 2021-10-27 PROCEDURE — 92014 COMPRE OPH EXAM EST PT 1/>: CPT | Performed by: OPHTHALMOLOGY

## 2021-10-27 PROCEDURE — 92083 EXTENDED VISUAL FIELD XM: CPT | Performed by: OPHTHALMOLOGY

## 2021-10-27 PROCEDURE — 92133 CPTRZD OPH DX IMG PST SGM ON: CPT | Performed by: OPHTHALMOLOGY

## 2021-10-27 ASSESSMENT — REFRACTION_MANIFEST
OS_VA1: 20/25+
OD_AXIS: 150
OS_AXIS: 352
OS_VA2: J1
OS_ADD: +2.50
OD_AXIS: 150
OD_VA2: J1
OS_VA1: 20/25+/-
OD_VA1: 20/20-1
OD_ADD: +2.50
OD_VA2: 20/20(J1+)
OS_SPHERE: -0.50
OD_CYLINDER: -0.25
OD_SPHERE: -0.25
OS_CYLINDER: -1.25
OS_ADD: +2.75
OD_CYLINDER: -0.50
OS_CYLINDER: -1.00
OS_AXIS: 025
OD_ADD: +2.50
OS_VA2: 20/20(J1+)
OD_SPHERE: -0.50
OD_VA1: 20/20-1
OS_SPHERE: -0.50

## 2021-10-27 ASSESSMENT — TONOMETRY
OD_IOP_MMHG: 11
OS_IOP_MMHG: 11

## 2021-10-27 ASSESSMENT — REFRACTION_AUTOREFRACTION
OD_CYLINDER: -0.75
OS_SPHERE: -0.75
OS_CYLINDER: -2.00
OS_AXIS: 028
OD_SPHERE: -0.25
OD_AXIS: 149

## 2021-10-27 ASSESSMENT — SPHEQUIV_DERIVED
OD_SPHEQUIV: -0.625
OD_SPHEQUIV: -0.625
OS_SPHEQUIV: -1.125
OS_SPHEQUIV: -1
OS_SPHEQUIV: -1.75
OD_SPHEQUIV: -0.5

## 2021-10-27 ASSESSMENT — KERATOMETRY
OS_AXISANGLE_DEGREES: 111
METHOD_AUTO_MANUAL: AUTO
OD_AXISANGLE_DEGREES: 079
OD_K2POWER_DIOPTERS: 45.75
OS_K1POWER_DIOPTERS: 42.50
OD_K1POWER_DIOPTERS: 42.75
OS_K2POWER_DIOPTERS: 45.75

## 2021-10-27 ASSESSMENT — AXIALLENGTH_DERIVED
OS_AL: 24.053
OD_AL: 23.56
OD_AL: 23.56
OS_AL: 23.753
OD_AL: 23.5115
OS_AL: 23.8025

## 2021-10-27 ASSESSMENT — VASCULARIZATION: OD_VASCULARIZATION: NASAL PANNUS

## 2021-10-27 ASSESSMENT — VISUAL ACUITY
OD_BCVA: 20/70+2
OS_BCVA: 20/25-2

## 2021-10-27 ASSESSMENT — CONFRONTATIONAL VISUAL FIELD TEST (CVF)
OS_FINDINGS: FULL
OD_FINDINGS: FULL

## 2021-11-10 ENCOUNTER — APPOINTMENT (OUTPATIENT)
Dept: ORTHOPEDIC SURGERY | Facility: CLINIC | Age: 76
End: 2021-11-10
Payer: MEDICARE

## 2021-11-10 VITALS
SYSTOLIC BLOOD PRESSURE: 177 MMHG | HEART RATE: 72 BPM | DIASTOLIC BLOOD PRESSURE: 77 MMHG | OXYGEN SATURATION: 99 % | WEIGHT: 103 LBS | HEIGHT: 63 IN | BODY MASS INDEX: 18.25 KG/M2

## 2021-11-10 PROCEDURE — 99214 OFFICE O/P EST MOD 30 MIN: CPT

## 2021-11-10 RX ORDER — LIDOCAINE 5% 700 MG/1
5 PATCH TOPICAL
Qty: 12 | Refills: 1 | Status: ACTIVE | COMMUNITY
Start: 2021-11-10 | End: 1900-01-01

## 2021-11-10 NOTE — HISTORY OF PRESENT ILLNESS
[de-identified] : Please the patient states that she has had significant symptoms since last time I have seen her.  She did have severe right lower extremity symptoms.  She has pain with movement right lateral thigh.  she denies any bowel bladder issues.  She denies any saddle anesthesia.  \par \par 10/25/21\par This is a 76-year-old female here today for evaluation of her low back pain.  She denies any radicular pain down her legs.  She denies any weakness.  She has been dealing with this right-sided low back pain for 2+ years.  She denies any bowel bladder issues.  She denies any saddle anesthesia.

## 2021-11-10 NOTE — PHYSICAL EXAM
[de-identified] : Lumbar Physical Exam\par \par Gait - Normal\par \par Station - Normal\par \par Sagittal balance - Normal\par \par Compensatory mechanism? - None\par \par Heel walk - Normal\par \par Toe walk - Normal\par \par Reflexes\par Patellar - normal\par Gastroc - normal\par Clonus - No\par \par Hip Exam - Normal\par \par Straight leg raise - none\par \par Pulses - 2+ dp/pt\par \par Range of motion - normal\par \par Sensation \par Sensation intact to light touch in L1, L2, L3, L4, L5 and S1 dermatomes bilaterally\par \par Motor\par 	IP	Quad	HS	TA	Gastroc	EHL\par Right	5/5	5/5	5/5	5/5	5/5	5/5\par Left	5/5	5/5	5/5	5/5	5/5	5/5 [de-identified] : Lumbar radiographs\par L4-L5 spondylolisthesis noted\par Facet arthropathy noted\par Disc degeneration\par Degenerative scoliosis noted\par \par Thoracic radiographs\par Thoracic spondylosis noted

## 2021-11-10 NOTE — ASSESSMENT
[FreeTextEntry1] : I had a lengthy discussion with the patient in regards to their treatment plan and diagnosis.  Their symptoms have persisted despite the conservative management they have attempted thus far.  As a result I would like to proceed with a lumbar MRI.  In tandem with this they should begin physical therapy/home therapy program.  The patient can take Tylenol/NSAIDs as needed for pain control if medically able to.  I will have the patient follow-up in 3 to 4 weeks for repeat clinical evaluation.  I encouraged them to follow-up sooner if their symptoms worsen or change in any way.\par \par The patient has tried the following treatments:\par Activity modification          +\par Ice/Compression                  +\par Braces                                     -\par NSAIDS                                   +\par Physical Therapy                   +\par \par Please note that over 6 weeks of time was spent in the conservative care mentioned above.  Please note that over 30 minutes of time was spent in care of this patient which includes previsit preparation, in person visit, post visit documentation.

## 2021-12-06 ENCOUNTER — APPOINTMENT (OUTPATIENT)
Dept: ORTHOPEDIC SURGERY | Facility: CLINIC | Age: 76
End: 2021-12-06

## 2021-12-13 ENCOUNTER — APPOINTMENT (OUTPATIENT)
Dept: ORTHOPEDIC SURGERY | Facility: CLINIC | Age: 76
End: 2021-12-13

## 2021-12-27 ENCOUNTER — APPOINTMENT (OUTPATIENT)
Dept: ORTHOPEDIC SURGERY | Facility: CLINIC | Age: 76
End: 2021-12-27
Payer: MEDICARE

## 2021-12-27 PROCEDURE — 99214 OFFICE O/P EST MOD 30 MIN: CPT

## 2021-12-27 NOTE — ASSESSMENT
[FreeTextEntry1] : I had a lengthy discussion with the patient in regards to treatment plan and diagnosis. There are no red flag findings on imaging nor are there any red flag findings on clinical exam.  Therefore we will proceed with a course of conservative treatment.  This would include physical therapy/home exercise program, Tylenol, NSAIDs as medically indicated.  The patient will follow up with me in approximately 12 weeks.  I encouraged the patient to follow-up sooner if there are any new or worsening symptoms.  She will consider an injection but at this point she will hold off for now.

## 2021-12-27 NOTE — HISTORY OF PRESENT ILLNESS
[de-identified] : Today the patient states that she is still having the same symptoms as last time.  She states the majority of her pain is in her low back today.  She denies any severe right lower extremity symptoms.  She did state that a few weeks ago she did have significant right lower extremity symptoms but not today.  She denies any bowel bladder issues.  She denies any saddle anesthesia.\par \par 11/10/21\par Today the patient states that she has had significant symptoms since last time I have seen her.  She did have severe right lower extremity symptoms.  She has pain with movement right lateral thigh.  she denies any bowel bladder issues.  She denies any saddle anesthesia.  \par \par 10/25/21\par This is a 76-year-old female here today for evaluation of her low back pain.  She denies any radicular pain down her legs.  She denies any weakness.  She has been dealing with this right-sided low back pain for 2+ years.  She denies any bowel bladder issues.  She denies any saddle anesthesia.

## 2021-12-27 NOTE — PHYSICAL EXAM
[de-identified] : Lumbar Physical Exam\par \par Gait - Normal\par \par Station - Normal\par \par Sagittal balance - Normal\par \par Compensatory mechanism? - None\par \par Heel walk - Normal\par \par Toe walk - Normal\par \par Reflexes\par Patellar - normal\par Gastroc - normal\par Clonus - No\par \par Hip Exam - Normal\par \par Straight leg raise - none\par \par Pulses - 2+ dp/pt\par \par Range of motion - normal\par \par Sensation \par Sensation intact to light touch in L1, L2, L3, L4, L5 and S1 dermatomes bilaterally\par \par Motor\par 	IP	Quad	HS	TA	Gastroc	EHL\par Right	5/5	5/5	5/5	5/5	5/5	5/5\par Left	5/5	5/5	5/5	5/5	5/5	5/5 [de-identified] : Lumbar radiographs\par L4-L5 spondylolisthesis noted\par Facet arthropathy noted\par Disc degeneration\par Degenerative scoliosis noted\par \par Thoracic radiographs\par Thoracic spondylosis noted\par \par Lumbar MRI reviewed\par There is some lateral recess stenosis at L4-L5

## 2022-01-13 ENCOUNTER — APPOINTMENT (OUTPATIENT)
Dept: ORTHOPEDIC SURGERY | Facility: CLINIC | Age: 77
End: 2022-01-13
Payer: MEDICARE

## 2022-01-13 VITALS
HEIGHT: 63 IN | HEART RATE: 75 BPM | SYSTOLIC BLOOD PRESSURE: 161 MMHG | BODY MASS INDEX: 18.25 KG/M2 | WEIGHT: 103 LBS | DIASTOLIC BLOOD PRESSURE: 84 MMHG | OXYGEN SATURATION: 95 %

## 2022-01-13 PROCEDURE — 20610 DRAIN/INJ JOINT/BURSA W/O US: CPT | Mod: RT

## 2022-01-13 PROCEDURE — 99214 OFFICE O/P EST MOD 30 MIN: CPT | Mod: 57

## 2022-01-13 NOTE — PROCEDURE
[Injection] : Injection [Right] : of the right [Inflammation] : Inflammation [Risk] : risk [Benefits] : benefits [Alcohol] : Alcohol [Lateral] : lateral [18] : an 18-gauge [1% Lidocaine___(mL)] : [unfilled] mL of 1% Lidocaine [Methylpred. 40mg/mL___(mL)] : [unfilled] mL 40mg/mL methylprednisolone [Bandage Applied] : a bandage [Tolerated Well] : The patient tolerated the procedure well [None] : none

## 2022-01-13 NOTE — ASSESSMENT
[FreeTextEntry1] : I had a lengthy discussion with the patient in regards to treatment plan and diagnosis. There are no red flag findings on imaging nor are there any red flag findings on clinical exam.  Therefore we will proceed with a course of conservative treatment.  This would include physical therapy/home exercise program, Tylenol, NSAIDs as medically indicated.  The patient will follow up with me in approximately 12 weeks.  I encouraged the patient to follow-up sooner if there are any new or worsening symptoms.  The patient will also follow-up with  for possible epidural on her back.  She knows to call at any time if her symptoms worsen or change in any way.

## 2022-01-13 NOTE — PHYSICAL EXAM
[de-identified] : Lumbar Physical Exam\par \par Gait - Normal\par \par Station - Normal\par \par Sagittal balance - Normal\par \par Compensatory mechanism? - None\par \par Heel walk - Normal\par \par Toe walk - Normal\par \par Reflexes\par Patellar - normal\par Gastroc - normal\par Clonus - No\par \par Hip Exam -decreased range of motion\par \par Straight leg raise - none\par \par Pulses - 2+ dp/pt\par \par Range of motion - normal\par \par Sensation \par Sensation intact to light touch in L1, L2, L3, L4, L5 and S1 dermatomes bilaterally\par \par Motor\par 	IP	Quad	HS	TA	Gastroc	EHL\par Right	5/5	5/5	5/5	5/5	5/5	5/5\par Left	5/5	5/5	5/5	5/5	5/5	5/5\par \par Point tenderness over the right lateral thigh [de-identified] : Lumbar radiographs\par L4-L5 spondylolisthesis noted\par Facet arthropathy noted\par Disc degeneration\par Degenerative scoliosis noted\par \par Thoracic radiographs\par Thoracic spondylosis noted\par \par Lumbar MRI reviewed\par There is some lateral recess stenosis at L4-L5

## 2022-01-13 NOTE — HISTORY OF PRESENT ILLNESS
[de-identified] : Today the patient states that she is doing well overall.  She walks approximately 1 mile a day.  She does have some right low back pain.  She also has some right lateral thigh pain.  She denies any bowel bladder issues.  She denies any saddle anesthesia.  She is here today to discuss her treatment options.\par \par 12/27/21\par Today the patient states that she is still having the same symptoms as last time.  She states the majority of her pain is in her low back today.  She denies any severe right lower extremity symptoms.  She did state that a few weeks ago she did have significant right lower extremity symptoms but not today.  She denies any bowel bladder issues.  She denies any saddle anesthesia.\par \par 11/10/21\par Today the patient states that she has had significant symptoms since last time I have seen her.  She did have severe right lower extremity symptoms.  She has pain with movement right lateral thigh.  she denies any bowel bladder issues.  She denies any saddle anesthesia.  \par \par 10/25/21\par This is a 76-year-old female here today for evaluation of her low back pain.  She denies any radicular pain down her legs.  She denies any weakness.  She has been dealing with this right-sided low back pain for 2+ years.  She denies any bowel bladder issues.  She denies any saddle anesthesia.

## 2022-02-09 ENCOUNTER — APPOINTMENT (OUTPATIENT)
Dept: ORTHOPEDIC SURGERY | Facility: CLINIC | Age: 77
End: 2022-02-09
Payer: SELF-PAY

## 2022-02-09 VITALS
DIASTOLIC BLOOD PRESSURE: 77 MMHG | OXYGEN SATURATION: 97 % | WEIGHT: 103 LBS | HEART RATE: 67 BPM | HEIGHT: 63 IN | BODY MASS INDEX: 18.25 KG/M2 | SYSTOLIC BLOOD PRESSURE: 172 MMHG

## 2022-02-09 PROCEDURE — 99214 OFFICE O/P EST MOD 30 MIN: CPT | Mod: 25

## 2022-02-09 PROCEDURE — 20610 DRAIN/INJ JOINT/BURSA W/O US: CPT

## 2022-02-09 NOTE — ASSESSMENT
[FreeTextEntry1] : I discussed with the patient her current symptoms.  At this point we will continue to treat her symptomatically with anti-inflammatories and Tylenol as medically indicated.  I will have her follow-up in approximately 2 to 3 months for repeat clinical evaluation.  I did encourage her to reach out to me at any point if she has any new or worsening symptoms as well.

## 2022-02-09 NOTE — PROCEDURE
[Injection] : Injection [Bilateral] : of bilateral [Knee Joint] : knee joint [Inflammation] : Inflammation [Patient] : patient [Risk] : risk [Benefits] : benefits [Alcohol] : Alcohol [Lateral] : lateral [20] : a 20-gauge [1% Lidocaine___(mL)] : [unfilled] mL of 1% Lidocaine [Methylpred. 40mg/mL___(mL)] : [unfilled] mL of 40mg/mL methylprednisolone [Bandage Applied] : a bandage [Tolerated Well] : The patient tolerated the procedure well [None] : none

## 2022-02-09 NOTE — HISTORY OF PRESENT ILLNESS
[de-identified] : Today the patient states that overall she is doing well.  She states that the right lateral thigh pain that she had previously has gotten better with the greater trochanteric bursa injection she received at her last visit.  Currently she is dealing with knee pain.  She has known knee osteoarthritis.  She denies any bowel bladder issues.  She denies any saddle anesthesia.  She denies any focal areas of weakness or numbness.\par \par 01/13/22\par Today the patient states that she is doing well overall.  She walks approximately 1 mile a day.  She does have some right low back pain.  She also has some right lateral thigh pain.  She denies any bowel bladder issues.  She denies any saddle anesthesia.  She is here today to discuss her treatment options.\par \par 12/27/21\par Today the patient states that she is still having the same symptoms as last time.  She states the majority of her pain is in her low back today.  She denies any severe right lower extremity symptoms.  She did state that a few weeks ago she did have significant right lower extremity symptoms but not today.  She denies any bowel bladder issues.  She denies any saddle anesthesia.\par \par 11/10/21\par Today the patient states that she has had significant symptoms since last time I have seen her.  She did have severe right lower extremity symptoms.  She has pain with movement right lateral thigh.  she denies any bowel bladder issues.  She denies any saddle anesthesia.  \par \par 10/25/21\par This is a 76-year-old female here today for evaluation of her low back pain.  She denies any radicular pain down her legs.  She denies any weakness.  She has been dealing with this right-sided low back pain for 2+ years.  She denies any bowel bladder issues.  She denies any saddle anesthesia.

## 2022-02-09 NOTE — PHYSICAL EXAM
[de-identified] : Lumbar Physical Exam\par \par Gait - Normal\par \par Station - Normal\par \par Sagittal balance - Normal\par \par Compensatory mechanism? - None\par \par Heel walk - Normal\par \par Toe walk - Normal\par \par Reflexes\par Patellar - normal\par Gastroc - normal\par Clonus - No\par \par Hip Exam -decreased range of motion\par \par Straight leg raise - none\par \par Pulses - 2+ dp/pt\par \par Range of motion - normal\par \par Sensation \par Sensation intact to light touch in L1, L2, L3, L4, L5 and S1 dermatomes bilaterally\par \par Motor\par 	IP	Quad	HS	TA	Gastroc	EHL\par Right	5/5	5/5	5/5	5/5	5/5	5/5\par Left	5/5	5/5	5/5	5/5	5/5	5/5\par \par Bilateral knees examined\par Pain with extremes of range of motion\par ACL PCL LCL MCL with solid endpoints\par Joint line tenderness noted\par Negative Lenin's test [de-identified] : Lumbar radiographs\par L4-L5 spondylolisthesis noted\par Facet arthropathy noted\par Disc degeneration\par Degenerative scoliosis noted\par \par Thoracic radiographs\par Thoracic spondylosis noted\par \par Lumbar MRI reviewed\par There is some lateral recess stenosis at L4-L5

## 2022-04-06 ENCOUNTER — OFFICE (OUTPATIENT)
Dept: URBAN - METROPOLITAN AREA CLINIC 90 | Facility: CLINIC | Age: 77
Setting detail: OPHTHALMOLOGY
End: 2022-04-06
Payer: MEDICARE

## 2022-04-06 DIAGNOSIS — H43.392: ICD-10-CM

## 2022-04-06 DIAGNOSIS — D31.32: ICD-10-CM

## 2022-04-06 DIAGNOSIS — H35.373: ICD-10-CM

## 2022-04-06 DIAGNOSIS — H16.421: ICD-10-CM

## 2022-04-06 DIAGNOSIS — H43.813: ICD-10-CM

## 2022-04-06 DIAGNOSIS — H40.013: ICD-10-CM

## 2022-04-06 DIAGNOSIS — H26.493: ICD-10-CM

## 2022-04-06 DIAGNOSIS — H18.452: ICD-10-CM

## 2022-04-06 DIAGNOSIS — H35.3121: ICD-10-CM

## 2022-04-06 PROCEDURE — 92134 CPTRZ OPH DX IMG PST SGM RTA: CPT | Performed by: OPHTHALMOLOGY

## 2022-04-06 PROCEDURE — 92014 COMPRE OPH EXAM EST PT 1/>: CPT | Performed by: OPHTHALMOLOGY

## 2022-04-06 ASSESSMENT — REFRACTION_AUTOREFRACTION
OS_AXIS: 028
OS_SPHERE: -0.75
OD_CYLINDER: -0.75
OD_SPHERE: -0.25
OS_CYLINDER: -2.00
OD_AXIS: 149

## 2022-04-06 ASSESSMENT — REFRACTION_MANIFEST
OD_CYLINDER: -0.25
OS_SPHERE: -0.50
OS_VA2: J1
OD_SPHERE: -0.50
OS_VA1: 20/25+
OD_SPHERE: -0.25
OD_ADD: +2.50
OS_CYLINDER: -1.25
OS_AXIS: 352
OD_AXIS: 150
OD_VA1: 20/20-1
OS_VA2: 20/20(J1+)
OD_VA2: J1
OD_ADD: +2.50
OS_ADD: +2.50
OD_VA2: 20/20(J1+)
OS_SPHERE: -0.50
OS_VA1: 20/25+/-
OD_CYLINDER: -0.50
OD_VA1: 20/20-1
OS_CYLINDER: -1.00
OS_ADD: +2.75
OD_AXIS: 150
OS_AXIS: 025

## 2022-04-06 ASSESSMENT — TONOMETRY
OS_IOP_MMHG: 12
OD_IOP_MMHG: 12

## 2022-04-06 ASSESSMENT — AXIALLENGTH_DERIVED
OS_AL: 23.8025
OD_AL: 23.5115
OD_AL: 23.56
OD_AL: 23.56
OS_AL: 24.053
OS_AL: 23.753

## 2022-04-06 ASSESSMENT — KERATOMETRY
OS_K1POWER_DIOPTERS: 42.50
OS_AXISANGLE_DEGREES: 111
METHOD_AUTO_MANUAL: AUTO
OD_K2POWER_DIOPTERS: 45.75
OD_AXISANGLE_DEGREES: 079
OS_K2POWER_DIOPTERS: 45.75
OD_K1POWER_DIOPTERS: 42.75

## 2022-04-06 ASSESSMENT — VISUAL ACUITY
OD_BCVA: 20/50-2
OS_BCVA: 20/25-2

## 2022-04-06 ASSESSMENT — SPHEQUIV_DERIVED
OD_SPHEQUIV: -0.5
OS_SPHEQUIV: -1.75
OS_SPHEQUIV: -1.125
OD_SPHEQUIV: -0.625
OS_SPHEQUIV: -1
OD_SPHEQUIV: -0.625

## 2022-04-06 ASSESSMENT — CONFRONTATIONAL VISUAL FIELD TEST (CVF)
OS_FINDINGS: FULL
OD_FINDINGS: FULL

## 2022-04-06 ASSESSMENT — VASCULARIZATION: OD_VASCULARIZATION: NASAL PANNUS

## 2022-04-13 ENCOUNTER — APPOINTMENT (OUTPATIENT)
Dept: ORTHOPEDIC SURGERY | Facility: CLINIC | Age: 77
End: 2022-04-13
Payer: MEDICARE

## 2022-04-13 VITALS
WEIGHT: 103 LBS | DIASTOLIC BLOOD PRESSURE: 75 MMHG | SYSTOLIC BLOOD PRESSURE: 130 MMHG | HEIGHT: 63 IN | BODY MASS INDEX: 18.25 KG/M2

## 2022-04-13 PROCEDURE — 20610 DRAIN/INJ JOINT/BURSA W/O US: CPT

## 2022-04-13 PROCEDURE — 99214 OFFICE O/P EST MOD 30 MIN: CPT | Mod: 57

## 2022-04-13 NOTE — ASSESSMENT
[FreeTextEntry1] : This is a 77-year-old female that is here today for evaluation of her back and leg symptoms.  At this point we will proceed with continued physical therapy.  She does have some areas of stenosis on her MRI.  I would like her to follow-up with a pain management physician for evaluation of a possible epidural steroid injection.  I will have her follow-up in 6 weeks time.  I did encourage her to reach out to me at any time she has any questions in regards to her care

## 2022-04-13 NOTE — HISTORY OF PRESENT ILLNESS
[de-identified] : Today the patient states that overall her symptoms are unchanged.  She states that after her last injection she had significant improvement in her right thigh, lateral thigh pain.  She denies any bowel bladder issues.  She denies any saddle anesthesia.  She is still able to walk fairly well.  She is here today to discuss neck steps and treatment.  In particular she would like to pursue further physical therapy and another shot today.\par \par 02/09/22\par Today the patient states that overall she is doing well.  She states that the right lateral thigh pain that she had previously has gotten better with the greater trochanteric bursa injection she received at her last visit.  Currently she is dealing with knee pain.  She has known knee osteoarthritis.  She denies any bowel bladder issues.  She denies any saddle anesthesia.  She denies any focal areas of weakness or numbness.\par \par 01/13/22\par Today the patient states that she is doing well overall.  She walks approximately 1 mile a day.  She does have some right low back pain.  She also has some right lateral thigh pain.  She denies any bowel bladder issues.  She denies any saddle anesthesia.  She is here today to discuss her treatment options.\par \par 12/27/21\par Today the patient states that she is still having the same symptoms as last time.  She states the majority of her pain is in her low back today.  She denies any severe right lower extremity symptoms.  She did state that a few weeks ago she did have significant right lower extremity symptoms but not today.  She denies any bowel bladder issues.  She denies any saddle anesthesia.\par \par 11/10/21\par Today the patient states that she has had significant symptoms since last time I have seen her.  She did have severe right lower extremity symptoms.  She has pain with movement right lateral thigh.  she denies any bowel bladder issues.  She denies any saddle anesthesia.  \par \par 10/25/21\par This is a 76-year-old female here today for evaluation of her low back pain.  She denies any radicular pain down her legs.  She denies any weakness.  She has been dealing with this right-sided low back pain for 2+ years.  She denies any bowel bladder issues.  She denies any saddle anesthesia.

## 2022-04-13 NOTE — PHYSICAL EXAM
[de-identified] : Lumbar Physical Exam\par \par Gait - Normal\par \par Station - Normal\par \par Sagittal balance - Normal\par \par Compensatory mechanism? - None\par \par Heel walk - Normal\par \par Toe walk - Normal\par \par Reflexes\par Patellar - normal\par Gastroc - normal\par Clonus - No\par \par Hip Exam -decreased range of motion\par \par Straight leg raise - none\par \par Pulses - 2+ dp/pt\par \par Range of motion - normal\par \par Sensation \par Sensation intact to light touch in L1, L2, L3, L4, L5 and S1 dermatomes bilaterally\par \par Motor\par 	IP	Quad	HS	TA	Gastroc	EHL\par Right	5/5	5/5	5/5	5/5	5/5	5/5\par Left	5/5	5/5	5/5	5/5	5/5	5/5\par \par Bilateral knees examined\par Pain with extremes of range of motion\par ACL PCL LCL MCL with solid endpoints\par Joint line tenderness noted\par Negative Lenin's test [de-identified] : Lumbar radiographs\par L4-L5 spondylolisthesis noted\par Facet arthropathy noted\par Disc degeneration\par Degenerative scoliosis noted\par \par Thoracic radiographs\par Thoracic spondylosis noted\par \par Lumbar MRI reviewed\par There is some lateral recess stenosis at L4-L5

## 2022-04-13 NOTE — PROCEDURE
[Injection] : Injection [Right] : of the right [Greater Trochanter] : greater trochanteric bursa [Inflammation] : Inflammation [Risk] : risk [Benefits] : benefits [Alcohol] : Alcohol [Ethyl Chloride Spray] : ethyl chloride spray was used as a topical anesthetic [Lateral] : lateral [18] : an 18-gauge [1% Lidocaine___(mL)] : [unfilled] mL of 1% Lidocaine [Methylpred. 40mg/mL___(mL)] : [unfilled] mL 40mg/mL methylprednisolone [Bandage Applied] : a bandage [Tolerated Well] : The patient tolerated the procedure well [None] : none

## 2022-06-20 NOTE — REVIEW OF SYSTEMS
[FreeTextEntry1] : AEEG June 2021: infrequent generalized spikes, no seizure\par Brain MRI 2/2/21: normal [Negative] : Heme/Lymph [FreeTextEntry9] : Back and leg symptoms

## 2022-08-24 ENCOUNTER — APPOINTMENT (OUTPATIENT)
Dept: ORTHOPEDIC SURGERY | Facility: CLINIC | Age: 77
End: 2022-08-24

## 2022-09-12 ENCOUNTER — APPOINTMENT (OUTPATIENT)
Dept: ORTHOPEDIC SURGERY | Facility: CLINIC | Age: 77
End: 2022-09-12

## 2022-09-12 PROCEDURE — 99214 OFFICE O/P EST MOD 30 MIN: CPT | Mod: 57

## 2022-09-12 PROCEDURE — 20610 DRAIN/INJ JOINT/BURSA W/O US: CPT

## 2022-09-12 NOTE — PROCEDURE
[Injection] : Injection [Bilateral] : of bilateral [Knee Joint] : knee joint [Inflammation] : Inflammation [Patient] : patient [Risk] : risk [Benefits] : benefits [Alcohol] : Alcohol [Ethyl Chloride Spray] : ethyl chloride spray was used as a topical anesthetic [Medial] : medial [25] : a 25-gauge [1% Lidocaine___(mL)] : [unfilled] mL of 1% Lidocaine [Methylpred. 40mg/mL___(mL)] : [unfilled] mL of 40mg/mL methylprednisolone [Bandage Applied] : a bandage [Tolerated Well] : The patient tolerated the procedure well

## 2022-09-12 NOTE — HISTORY OF PRESENT ILLNESS
[de-identified] : Today the patient states that she is still dealing with some back pain but her radicular pain has improved after injections.  She denies any real radicular type pain today.  She does describe significant bilateral knee pain which is chronic in nature.  At her last appointment I did give her injections which she does feel helped her symptoms substantially.  She denies any bowel bladder issues.  She denies any saddle anesthesia.\par \par 04/13/22\par Today the patient states that overall her symptoms are unchanged.  She states that after her last injection she had significant improvement in her right thigh, lateral thigh pain.  She denies any bowel bladder issues.  She denies any saddle anesthesia.  She is still able to walk fairly well.  She is here today to discuss neck steps and treatment.  In particular she would like to pursue further physical therapy and another shot today.\par \par 02/09/22\par Today the patient states that overall she is doing well.  She states that the right lateral thigh pain that she had previously has gotten better with the greater trochanteric bursa injection she received at her last visit.  Currently she is dealing with knee pain.  She has known knee osteoarthritis.  She denies any bowel bladder issues.  She denies any saddle anesthesia.  She denies any focal areas of weakness or numbness.\par \par 01/13/22\par Today the patient states that she is doing well overall.  She walks approximately 1 mile a day.  She does have some right low back pain.  She also has some right lateral thigh pain.  She denies any bowel bladder issues.  She denies any saddle anesthesia.  She is here today to discuss her treatment options.\par \par 12/27/21\par Today the patient states that she is still having the same symptoms as last time.  She states the majority of her pain is in her low back today.  She denies any severe right lower extremity symptoms.  She did state that a few weeks ago she did have significant right lower extremity symptoms but not today.  She denies any bowel bladder issues.  She denies any saddle anesthesia.\par \par 11/10/21\par Today the patient states that she has had significant symptoms since last time I have seen her.  She did have severe right lower extremity symptoms.  She has pain with movement right lateral thigh.  she denies any bowel bladder issues.  She denies any saddle anesthesia.  \par \par 10/25/21\par This is a 76-year-old female here today for evaluation of her low back pain.  She denies any radicular pain down her legs.  She denies any weakness.  She has been dealing with this right-sided low back pain for 2+ years.  She denies any bowel bladder issues.  She denies any saddle anesthesia.

## 2022-09-12 NOTE — REASON FOR VISIT
[Follow-Up Visit] : a follow-up visit for [Back Pain] : back pain [FreeTextEntry2] : Bilateral knee pain

## 2022-09-12 NOTE — PHYSICAL EXAM
[de-identified] : Lumbar Physical Exam\par \par Gait - Normal\par \par Station - Normal\par \par Sagittal balance - Normal\par \par Compensatory mechanism? - None\par \par Heel walk - Normal\par \par Toe walk - Normal\par \par Reflexes\par Patellar - normal\par Gastroc - normal\par Clonus - No\par \par Hip Exam -decreased range of motion\par \par Straight leg raise - none\par \par Pulses - 2+ dp/pt\par \par Range of motion - normal\par \par Sensation \par Sensation intact to light touch in L1, L2, L3, L4, L5 and S1 dermatomes bilaterally\par \par Motor\par 	IP	Quad	HS	TA	Gastroc	EHL\par Right	5/5	5/5	5/5	5/5	5/5	5/5\par Left	5/5	5/5	5/5	5/5	5/5	5/5\par \par Bilateral knees examined\par Pain with extremes of range of motion\par ACL PCL LCL MCL with solid endpoints\par Joint line tenderness noted\par Negative Lenin's test [de-identified] : Lumbar radiographs\par L4-L5 spondylolisthesis noted\par Facet arthropathy noted\par Disc degeneration\par Degenerative scoliosis noted\par \par Thoracic radiographs\par Thoracic spondylosis noted\par \par Lumbar MRI reviewed\par There is some lateral recess stenosis at L4-L5

## 2022-09-21 ENCOUNTER — OFFICE (OUTPATIENT)
Dept: URBAN - METROPOLITAN AREA CLINIC 90 | Facility: CLINIC | Age: 77
Setting detail: OPHTHALMOLOGY
End: 2022-09-21
Payer: MEDICARE

## 2022-09-21 DIAGNOSIS — H43.813: ICD-10-CM

## 2022-09-21 DIAGNOSIS — H16.421: ICD-10-CM

## 2022-09-21 DIAGNOSIS — H35.373: ICD-10-CM

## 2022-09-21 DIAGNOSIS — H43.392: ICD-10-CM

## 2022-09-21 DIAGNOSIS — H26.493: ICD-10-CM

## 2022-09-21 DIAGNOSIS — D31.32: ICD-10-CM

## 2022-09-21 DIAGNOSIS — H40.013: ICD-10-CM

## 2022-09-21 DIAGNOSIS — H35.3121: ICD-10-CM

## 2022-09-21 DIAGNOSIS — H18.452: ICD-10-CM

## 2022-09-21 PROCEDURE — 92083 EXTENDED VISUAL FIELD XM: CPT | Performed by: OPHTHALMOLOGY

## 2022-09-21 PROCEDURE — 92014 COMPRE OPH EXAM EST PT 1/>: CPT | Performed by: OPHTHALMOLOGY

## 2022-09-21 PROCEDURE — 92250 FUNDUS PHOTOGRAPHY W/I&R: CPT | Performed by: OPHTHALMOLOGY

## 2022-09-21 ASSESSMENT — REFRACTION_MANIFEST
OS_ADD: +2.50
OS_CYLINDER: -1.25
OD_AXIS: 150
OD_VA2: 20/20(J1+)
OD_VA1: 20/20-1
OD_CYLINDER: -0.25
OD_VA2: J1
OS_AXIS: 025
OS_VA2: 20/20(J1+)
OS_ADD: +2.75
OS_CYLINDER: -1.00
OS_SPHERE: -0.50
OS_SPHERE: -0.50
OS_VA1: 20/25+/-
OD_AXIS: 150
OD_SPHERE: -0.50
OD_VA1: 20/20-1
OS_AXIS: 352
OS_VA2: J1
OD_CYLINDER: -0.50
OD_ADD: +2.50
OD_ADD: +2.50
OD_SPHERE: -0.25
OS_VA1: 20/25+

## 2022-09-21 ASSESSMENT — SPHEQUIV_DERIVED
OS_SPHEQUIV: -1
OS_SPHEQUIV: -0.875
OD_SPHEQUIV: -0.5
OD_SPHEQUIV: -0.625
OD_SPHEQUIV: -0.5
OS_SPHEQUIV: -1.125

## 2022-09-21 ASSESSMENT — KERATOMETRY
OS_K2POWER_DIOPTERS: 45.25
OS_AXISANGLE_DEGREES: 111
METHOD_AUTO_MANUAL: AUTO
OS_K1POWER_DIOPTERS: 42.75
OD_K2POWER_DIOPTERS: 45.00
OD_AXISANGLE_DEGREES: 075
OD_K1POWER_DIOPTERS: 42.75

## 2022-09-21 ASSESSMENT — AXIALLENGTH_DERIVED
OD_AL: 23.6491
OS_AL: 23.8493
OD_AL: 23.6981
OS_AL: 23.7997
OS_AL: 23.7502
OD_AL: 23.6491

## 2022-09-21 ASSESSMENT — REFRACTION_AUTOREFRACTION
OD_CYLINDER: -0.50
OS_AXIS: 051
OS_SPHERE: -0.25
OD_AXIS: 131
OD_SPHERE: -0.25
OS_CYLINDER: -1.25

## 2022-09-21 ASSESSMENT — CONFRONTATIONAL VISUAL FIELD TEST (CVF)
OS_FINDINGS: FULL
OD_FINDINGS: FULL

## 2022-09-21 ASSESSMENT — TONOMETRY: OS_IOP_MMHG: 10

## 2022-09-21 ASSESSMENT — VASCULARIZATION: OD_VASCULARIZATION: NASAL PANNUS

## 2022-09-21 ASSESSMENT — VISUAL ACUITY
OD_BCVA: 20/50-2
OS_BCVA: 20/25+1

## 2022-10-06 ENCOUNTER — APPOINTMENT (OUTPATIENT)
Dept: ORTHOPEDIC SURGERY | Facility: CLINIC | Age: 77
End: 2022-10-06

## 2022-10-06 VITALS
HEART RATE: 81 BPM | SYSTOLIC BLOOD PRESSURE: 133 MMHG | DIASTOLIC BLOOD PRESSURE: 72 MMHG | BODY MASS INDEX: 18.07 KG/M2 | WEIGHT: 102 LBS | HEIGHT: 63 IN

## 2022-10-06 PROCEDURE — 20610 DRAIN/INJ JOINT/BURSA W/O US: CPT

## 2022-10-06 PROCEDURE — 99214 OFFICE O/P EST MOD 30 MIN: CPT | Mod: 57

## 2022-10-06 NOTE — PHYSICAL EXAM
[de-identified] : Lumbar Physical Exam\par \par Gait - Normal\par \par Station - Normal\par \par Sagittal balance - Normal\par \par Compensatory mechanism? - None\par \par Heel walk - Normal\par \par Toe walk - Normal\par \par Reflexes\par Patellar - normal\par Gastroc - normal\par Clonus - No\par \par Hip Exam -decreased range of motion\par \par Straight leg raise - none\par \par Pulses - 2+ dp/pt\par \par Range of motion - normal\par \par Sensation \par Sensation intact to light touch in L1, L2, L3, L4, L5 and S1 dermatomes bilaterally\par \par Motor\par 	IP	Quad	HS	TA	Gastroc	EHL\par Right	5/5	5/5	5/5	5/5	5/5	5/5\par Left	5/5	5/5	5/5	5/5	5/5	5/5\par \par Bilateral knees examined\par Pain with extremes of range of motion\par ACL PCL LCL MCL with solid endpoints\par Joint line tenderness noted\par Negative Lenin's test\par \par The patient does have point tenderness just above her right greater trochanteric [de-identified] : Lumbar radiographs\par L4-L5 spondylolisthesis noted\par Facet arthropathy noted\par Disc degeneration\par Degenerative scoliosis noted\par \par Thoracic radiographs\par Thoracic spondylosis noted\par \par Lumbar MRI reviewed\par There is some lateral recess stenosis at L4-L5

## 2022-10-06 NOTE — HISTORY OF PRESENT ILLNESS
[de-identified] : Today the patient states that she is dealing with some fairly severe right hip pain.  She describes pain that is over her right lateral thigh.  She denies any bowel bladder issues.  She denies any saddle anesthesia.  She does feel nauseous at times due to the severity of this pain.  She is eating and drinking well right now.  She has no significant abdominal pain.  He has no fevers or chills.\par \par 09/12/22\par Today the patient states that she is still dealing with some back pain but her radicular pain has improved after injections.  She denies any real radicular type pain today.  She does describe significant bilateral knee pain which is chronic in nature.  At her last appointment I did give her injections which she does feel helped her symptoms substantially.  She denies any bowel bladder issues.  She denies any saddle anesthesia.\par \par 04/13/22\par Today the patient states that overall her symptoms are unchanged.  She states that after her last injection she had significant improvement in her right thigh, lateral thigh pain.  She denies any bowel bladder issues.  She denies any saddle anesthesia.  She is still able to walk fairly well.  She is here today to discuss neck steps and treatment.  In particular she would like to pursue further physical therapy and another shot today.\par \par 02/09/22\par Today the patient states that overall she is doing well.  She states that the right lateral thigh pain that she had previously has gotten better with the greater trochanteric bursa injection she received at her last visit.  Currently she is dealing with knee pain.  She has known knee osteoarthritis.  She denies any bowel bladder issues.  She denies any saddle anesthesia.  She denies any focal areas of weakness or numbness.\par \par 01/13/22\par Today the patient states that she is doing well overall.  She walks approximately 1 mile a day.  She does have some right low back pain.  She also has some right lateral thigh pain.  She denies any bowel bladder issues.  She denies any saddle anesthesia.  She is here today to discuss her treatment options.\par \par 12/27/21\par Today the patient states that she is still having the same symptoms as last time.  She states the majority of her pain is in her low back today.  She denies any severe right lower extremity symptoms.  She did state that a few weeks ago she did have significant right lower extremity symptoms but not today.  She denies any bowel bladder issues.  She denies any saddle anesthesia.\par \par 11/10/21\par Today the patient states that she has had significant symptoms since last time I have seen her.  She did have severe right lower extremity symptoms.  She has pain with movement right lateral thigh.  she denies any bowel bladder issues.  She denies any saddle anesthesia.  \par \par 10/25/21\par This is a 76-year-old female here today for evaluation of her low back pain.  She denies any radicular pain down her legs.  She denies any weakness.  She has been dealing with this right-sided low back pain for 2+ years.  She denies any bowel bladder issues.  She denies any saddle anesthesia.

## 2022-10-06 NOTE — ASSESSMENT
[FreeTextEntry1] : Today the patient states that she is having this right lateral thigh pain.  We will treat her with an injection today.  I think she should continue walking and being active.  She does have a known history of lumbar spinal stenosis with radiculopathy.  I do think that she would benefit from continued management with pain management.  She does have a known appointment with [facility] which I encouraged her to keep.  Stated that if she has any new or worsening symptoms that she should proceed to the emergency room as she is traveling in the near future.  All questions were answered.  Patient was given this plan

## 2022-10-06 NOTE — REASON FOR VISIT
[Follow-Up Visit] : a follow-up visit for [Radiculopathy] : radiculopathy [FreeTextEntry2] : Right hip pain

## 2022-10-06 NOTE — PROCEDURE
[Injection] : Injection [Right] : on the right.   [Major Joint___] : [unfilled] joint [Inflammation] : Inflammation [Risk] : Risk [Benefits] : benefits [Alcohol] : Alcohol [Lateral] : lateral [20] : a 20-gauge [1% Lidocaine___(mL)] : [unfilled] mL of 1% Lidocaine [Methylpred. 40mg/mL___(mL)] : [unfilled] mL 40mg/mL methylprednisolone [Bandage Applied] : a bandage [Tolerated Well] : The patient tolerated the procedure well [None] : None [de-identified] : Just superior to GT

## 2022-11-14 ENCOUNTER — APPOINTMENT (OUTPATIENT)
Dept: ORTHOPEDIC SURGERY | Facility: CLINIC | Age: 77
End: 2022-11-14

## 2022-11-14 VITALS
HEIGHT: 63 IN | WEIGHT: 102 LBS | BODY MASS INDEX: 18.07 KG/M2 | SYSTOLIC BLOOD PRESSURE: 127 MMHG | DIASTOLIC BLOOD PRESSURE: 77 MMHG

## 2022-11-14 PROCEDURE — 99214 OFFICE O/P EST MOD 30 MIN: CPT

## 2022-11-14 RX ORDER — LIDOCAINE 5% 700 MG/1
5 PATCH TOPICAL
Qty: 12 | Refills: 1 | Status: ACTIVE | COMMUNITY
Start: 2022-11-14 | End: 1900-01-01

## 2022-11-14 NOTE — PHYSICAL EXAM
[de-identified] : Lumbar Physical Exam\par \par Gait - Normal\par \par Station - Normal\par \par Sagittal balance - Normal\par \par Compensatory mechanism? - None\par \par Heel walk - Normal\par \par Toe walk - Normal\par \par Reflexes\par Patellar - normal\par Gastroc - normal\par Clonus - No\par \par Hip Exam -decreased range of motion\par \par Straight leg raise - none\par \par Pulses - 2+ dp/pt\par \par Range of motion - normal\par \par Sensation \par Sensation intact to light touch in L1, L2, L3, L4, L5 and S1 dermatomes bilaterally\par \par Motor\par 	IP	Quad	HS	TA	Gastroc	EHL\par Right	5/5	5/5	5/5	5/5	5/5	5/5\par Left	5/5	5/5	5/5	5/5	5/5	5/5\par \par Bilateral knees examined\par Pain with extremes of range of motion\par ACL PCL LCL MCL with solid endpoints\par Joint line tenderness noted\par Negative Lenin's test\par  [de-identified] : Lumbar radiographs\par L4-L5 spondylolisthesis noted\par Facet arthropathy noted\par Disc degeneration\par Degenerative scoliosis noted\par \par Thoracic radiographs\par Thoracic spondylosis noted\par \par Lumbar MRI reviewed\par There is some lateral recess stenosis at L4-L5

## 2022-11-14 NOTE — HISTORY OF PRESENT ILLNESS
[de-identified] : Today the patient states that her right hip pain has improved substantially.  She does have some right knee pain.  She felt like the injection helped her last time but unfortunately her symptoms did return after several weeks.  She denies any bowel bladder issues.  She denies any saddle anesthesia.  She denies any radiating type symptoms down her legs.  She denies any back pain.\par \par 10/06/22\par Today the patient states that she is dealing with some fairly severe right hip pain.  She describes pain that is over her right lateral thigh.  She denies any bowel bladder issues.  She denies any saddle anesthesia.  She does feel nauseous at times due to the severity of this pain.  She is eating and drinking well right now.  She has no significant abdominal pain.  He has no fevers or chills.\par \par 09/12/22\par Today the patient states that she is still dealing with some back pain but her radicular pain has improved after injections.  She denies any real radicular type pain today.  She does describe significant bilateral knee pain which is chronic in nature.  At her last appointment I did give her injections which she does feel helped her symptoms substantially.  She denies any bowel bladder issues.  She denies any saddle anesthesia.\par \par 04/13/22\par Today the patient states that overall her symptoms are unchanged.  She states that after her last injection she had significant improvement in her right thigh, lateral thigh pain.  She denies any bowel bladder issues.  She denies any saddle anesthesia.  She is still able to walk fairly well.  She is here today to discuss neck steps and treatment.  In particular she would like to pursue further physical therapy and another shot today.\par \par 02/09/22\par Today the patient states that overall she is doing well.  She states that the right lateral thigh pain that she had previously has gotten better with the greater trochanteric bursa injection she received at her last visit.  Currently she is dealing with knee pain.  She has known knee osteoarthritis.  She denies any bowel bladder issues.  She denies any saddle anesthesia.  She denies any focal areas of weakness or numbness.\par \par 01/13/22\par Today the patient states that she is doing well overall.  She walks approximately 1 mile a day.  She does have some right low back pain.  She also has some right lateral thigh pain.  She denies any bowel bladder issues.  She denies any saddle anesthesia.  She is here today to discuss her treatment options.\par \par 12/27/21\par Today the patient states that she is still having the same symptoms as last time.  She states the majority of her pain is in her low back today.  She denies any severe right lower extremity symptoms.  She did state that a few weeks ago she did have significant right lower extremity symptoms but not today.  She denies any bowel bladder issues.  She denies any saddle anesthesia.\par \par 11/10/21\par Today the patient states that she has had significant symptoms since last time I have seen her.  She did have severe right lower extremity symptoms.  She has pain with movement right lateral thigh.  she denies any bowel bladder issues.  She denies any saddle anesthesia.  \par \par 10/25/21\par This is a 76-year-old female here today for evaluation of her low back pain.  She denies any radicular pain down her legs.  She denies any weakness.  She has been dealing with this right-sided low back pain for 2+ years.  She denies any bowel bladder issues.  She denies any saddle anesthesia.

## 2023-02-08 ENCOUNTER — APPOINTMENT (OUTPATIENT)
Dept: ORTHOPEDIC SURGERY | Facility: CLINIC | Age: 78
End: 2023-02-08
Payer: MEDICARE

## 2023-02-08 VITALS
WEIGHT: 102 LBS | DIASTOLIC BLOOD PRESSURE: 77 MMHG | BODY MASS INDEX: 18.07 KG/M2 | HEIGHT: 63 IN | SYSTOLIC BLOOD PRESSURE: 130 MMHG

## 2023-02-08 PROCEDURE — 99214 OFFICE O/P EST MOD 30 MIN: CPT | Mod: 57

## 2023-02-08 PROCEDURE — 20610 DRAIN/INJ JOINT/BURSA W/O US: CPT

## 2023-02-08 RX ORDER — TIZANIDINE 2 MG/1
2 TABLET ORAL EVERY 6 HOURS
Qty: 56 | Refills: 0 | Status: ACTIVE | COMMUNITY
Start: 2023-02-08 | End: 1900-01-01

## 2023-02-08 NOTE — ADDENDUM
[FreeTextEntry1] : I, Gael Moe, acted solely as a scribe for Dr. Nico Wilhelm MD on this date 02/08/2023  .\par  \par All medical record entries made by the Scribe were at my, Dr. Nico Wilhelm MD., direction and personally dictated by me on 02/08/2023 . I have reviewed the chart and agree that the record accurately reflects my personal performance of the history, physical exam, assessment and plan. I have also personally directed, reviewed, and agreed with the chart.

## 2023-02-08 NOTE — PROCEDURE
[de-identified] : Right Greater Trochanteric Injection\par Risks and benefits of the trochanteric bursa injection was discussed with the patient. Verbal consent was obtained. Area was prepped with Betadine. Local anesthetic spray was used.  6 cc 1% lidocaine with 2 cc of Dexamethasone was injected, using an 18 gauger spinal needle, in the posterior part of the greater trochanter. Patient tolerated the procedure well. Band-Aid was applied. \par \par Left Greater Trochanteric Injection\par Risks and benefits of the trochanteric bursa injection was discussed with the patient. Verbal consent was obtained. Area was prepped with Betadine. Local anesthetic spray was used.  6 cc 1% lidocaine with 2 cc of Dexamethasone was injected, using an 18 gauger spinal needle, in the posterior part of the greater trochanter. Patient tolerated the procedure well. Band-Aid was applied. \par

## 2023-02-08 NOTE — HISTORY OF PRESENT ILLNESS
[de-identified] : Today the patient states that she is still having some fairly severe bilateral hip pain.  This is lateral hip pain.  She denies any radiating type pain.  She felt like the last injection did help her.  She denies any bowel bladder issues.  She denies any saddle anesthesia.\par \par 11/14/22\par Today the patient states that her right hip pain has improved substantially.  She does have some right knee pain.  She felt like the injection helped her last time but unfortunately her symptoms did return after several weeks.  She denies any bowel bladder issues.  She denies any saddle anesthesia.  She denies any radiating type symptoms down her legs.  She denies any back pain.\par \par 10/06/22\par Today the patient states that she is dealing with some fairly severe right hip pain.  She describes pain that is over her right lateral thigh.  She denies any bowel bladder issues.  She denies any saddle anesthesia.  She does feel nauseous at times due to the severity of this pain.  She is eating and drinking well right now.  She has no significant abdominal pain.  He has no fevers or chills.\par \par 09/12/22\par Today the patient states that she is still dealing with some back pain but her radicular pain has improved after injections.  She denies any real radicular type pain today.  She does describe significant bilateral knee pain which is chronic in nature.  At her last appointment I did give her injections which she does feel helped her symptoms substantially.  She denies any bowel bladder issues.  She denies any saddle anesthesia.\par \par 04/13/22\par Today the patient states that overall her symptoms are unchanged.  She states that after her last injection she had significant improvement in her right thigh, lateral thigh pain.  She denies any bowel bladder issues.  She denies any saddle anesthesia.  She is still able to walk fairly well.  She is here today to discuss neck steps and treatment.  In particular she would like to pursue further physical therapy and another shot today.\par \par 02/09/22\par Today the patient states that overall she is doing well.  She states that the right lateral thigh pain that she had previously has gotten better with the greater trochanteric bursa injection she received at her last visit.  Currently she is dealing with knee pain.  She has known knee osteoarthritis.  She denies any bowel bladder issues.  She denies any saddle anesthesia.  She denies any focal areas of weakness or numbness.\par \par 01/13/22\par Today the patient states that she is doing well overall.  She walks approximately 1 mile a day.  She does have some right low back pain.  She also has some right lateral thigh pain.  She denies any bowel bladder issues.  She denies any saddle anesthesia.  She is here today to discuss her treatment options.\par \par 12/27/21\par Today the patient states that she is still having the same symptoms as last time.  She states the majority of her pain is in her low back today.  She denies any severe right lower extremity symptoms.  She did state that a few weeks ago she did have significant right lower extremity symptoms but not today.  She denies any bowel bladder issues.  She denies any saddle anesthesia.\par \par 11/10/21\par Today the patient states that she has had significant symptoms since last time I have seen her.  She did have severe right lower extremity symptoms.  She has pain with movement right lateral thigh.  she denies any bowel bladder issues.  She denies any saddle anesthesia.  \par \par 10/25/21\par This is a 76-year-old female here today for evaluation of her low back pain.  She denies any radicular pain down her legs.  She denies any weakness.  She has been dealing with this right-sided low back pain for 2+ years.  She denies any bowel bladder issues.  She denies any saddle anesthesia.

## 2023-02-08 NOTE — ASSESSMENT
[FreeTextEntry1] : I had a lengthy discussion with the patient in regards to treatment plan and diagnosis. There are no red flag findings on imaging nor are there any red flag findings on clinical exam.  Therefore we will proceed with a course of conservative treatment.  This would include physical therapy/home exercise program, Tylenol, NSAIDs as medically indicated.  A bilateral greater trochanteric cortisone injection was provided today in office for symptomatic relief. Patient tolerated procedure well. The patient will follow up with me in approximately 6 to 8 weeks.  I encouraged the patient to follow-up sooner if there are any new or worsening symptoms.

## 2023-02-08 NOTE — PHYSICAL EXAM
[de-identified] : Lumbar Physical Exam\par \par Gait - Normal\par \par Station - Normal\par \par Sagittal balance - Normal\par \par Compensatory mechanism? - None\par \par Heel walk - Normal\par \par Toe walk - Normal\par \par Reflexes\par Patellar - normal\par Gastroc - normal\par Clonus - No\par \par Hip Exam -decreased range of motion\par \par Straight leg raise - none\par \par Pulses - 2+ dp/pt\par \par Range of motion - normal\par \par Sensation \par Sensation intact to light touch in L1, L2, L3, L4, L5 and S1 dermatomes bilaterally\par \par Motor\par 	IP	Quad	HS	TA	Gastroc	EHL\par Right	5/5	5/5	5/5	5/5	5/5	5/5\par Left	5/5	5/5	5/5	5/5	5/5	5/5\par \par Bilateral knees examined\par Pain with extremes of range of motion\par ACL PCL LCL MCL with solid endpoints\par Joint line tenderness noted\par Negative Lenin's test\par  [de-identified] : Lumbar radiographs\par L4-L5 spondylolisthesis noted\par Facet arthropathy noted\par Disc degeneration\par Degenerative scoliosis noted\par \par Thoracic radiographs\par Thoracic spondylosis noted\par \par Lumbar MRI reviewed\par There is some lateral recess stenosis at L4-L5

## 2023-02-16 ENCOUNTER — APPOINTMENT (OUTPATIENT)
Dept: ORTHOPEDIC SURGERY | Facility: CLINIC | Age: 78
End: 2023-02-16

## 2023-03-20 ENCOUNTER — OFFICE (OUTPATIENT)
Dept: URBAN - METROPOLITAN AREA CLINIC 90 | Facility: CLINIC | Age: 78
Setting detail: OPHTHALMOLOGY
End: 2023-03-20
Payer: MEDICARE

## 2023-03-20 ENCOUNTER — RX ONLY (RX ONLY)
Age: 78
End: 2023-03-20

## 2023-03-20 DIAGNOSIS — H26.492: ICD-10-CM

## 2023-03-20 PROCEDURE — 66821 AFTER CATARACT LASER SURGERY: CPT | Performed by: OPHTHALMOLOGY

## 2023-03-20 ASSESSMENT — SPHEQUIV_DERIVED
OD_SPHEQUIV: -0.5
OD_SPHEQUIV: -0.5
OS_SPHEQUIV: -1.125
OD_SPHEQUIV: -0.625
OS_SPHEQUIV: -1
OS_SPHEQUIV: -0.875

## 2023-03-20 ASSESSMENT — REFRACTION_MANIFEST
OD_CYLINDER: -0.50
OS_CYLINDER: -1.00
OS_AXIS: 025
OD_VA2: J1
OD_VA1: 20/20-1
OD_VA2: 20/20(J1+)
OS_SPHERE: -0.50
OD_SPHERE: -0.25
OD_AXIS: 150
OS_VA2: J1
OD_CYLINDER: -0.25
OD_ADD: +2.50
OS_AXIS: 352
OS_ADD: +2.50
OD_AXIS: 150
OD_VA1: 20/20-1
OS_ADD: +2.75
OS_CYLINDER: -1.25
OS_VA2: 20/20(J1+)
OS_VA1: 20/25+
OD_SPHERE: -0.50
OS_SPHERE: -0.50
OS_VA1: 20/25+/-
OD_ADD: +2.50

## 2023-03-20 ASSESSMENT — AXIALLENGTH_DERIVED
OD_AL: 23.6491
OD_AL: 23.6981
OD_AL: 23.6491
OS_AL: 23.7502
OS_AL: 23.8493
OS_AL: 23.7997

## 2023-03-20 ASSESSMENT — REFRACTION_AUTOREFRACTION
OD_AXIS: 131
OD_CYLINDER: -0.50
OS_SPHERE: -0.25
OD_SPHERE: -0.25
OS_AXIS: 051
OS_CYLINDER: -1.25

## 2023-03-20 ASSESSMENT — KERATOMETRY
OD_K1POWER_DIOPTERS: 42.75
METHOD_AUTO_MANUAL: AUTO
OD_K2POWER_DIOPTERS: 45.00
OS_K1POWER_DIOPTERS: 42.75
OS_AXISANGLE_DEGREES: 111
OD_AXISANGLE_DEGREES: 075
OS_K2POWER_DIOPTERS: 45.25

## 2023-03-20 ASSESSMENT — CONFRONTATIONAL VISUAL FIELD TEST (CVF)
OD_FINDINGS: FULL
OS_FINDINGS: FULL

## 2023-03-20 ASSESSMENT — VASCULARIZATION: OD_VASCULARIZATION: NASAL PANNUS

## 2023-03-20 ASSESSMENT — VISUAL ACUITY
OD_BCVA: 20/80
OS_BCVA: 20/20

## 2023-03-27 ENCOUNTER — OFFICE (OUTPATIENT)
Dept: URBAN - METROPOLITAN AREA CLINIC 90 | Facility: CLINIC | Age: 78
Setting detail: OPHTHALMOLOGY
End: 2023-03-27
Payer: MEDICARE

## 2023-03-27 DIAGNOSIS — D31.32: ICD-10-CM

## 2023-03-27 DIAGNOSIS — H40.013: ICD-10-CM

## 2023-03-27 DIAGNOSIS — H35.373: ICD-10-CM

## 2023-03-27 DIAGNOSIS — H43.812: ICD-10-CM

## 2023-03-27 DIAGNOSIS — H43.392: ICD-10-CM

## 2023-03-27 DIAGNOSIS — H35.3121: ICD-10-CM

## 2023-03-27 DIAGNOSIS — H18.452: ICD-10-CM

## 2023-03-27 DIAGNOSIS — H26.491: ICD-10-CM

## 2023-03-27 DIAGNOSIS — H16.421: ICD-10-CM

## 2023-03-27 PROCEDURE — 99024 POSTOP FOLLOW-UP VISIT: CPT | Performed by: OPHTHALMOLOGY

## 2023-03-27 ASSESSMENT — AXIALLENGTH_DERIVED
OS_AL: 23.7997
OS_AL: 23.8493
OD_AL: 23.6981
OD_AL: 23.6491
OS_AL: 23.7502
OD_AL: 23.6491

## 2023-03-27 ASSESSMENT — SPHEQUIV_DERIVED
OD_SPHEQUIV: -0.5
OS_SPHEQUIV: -0.875
OD_SPHEQUIV: -0.625
OS_SPHEQUIV: -1.125
OS_SPHEQUIV: -1
OD_SPHEQUIV: -0.5

## 2023-03-27 ASSESSMENT — VISUAL ACUITY
OS_BCVA: 20/30-2
OD_BCVA: 20/50+2

## 2023-03-27 ASSESSMENT — REFRACTION_MANIFEST
OS_ADD: +2.50
OD_VA1: 20/20-1
OS_VA2: 20/20(J1+)
OD_VA2: 20/20(J1+)
OD_ADD: +2.50
OS_VA1: 20/25+
OS_AXIS: 025
OD_SPHERE: -0.25
OD_VA2: J1
OD_AXIS: 150
OD_AXIS: 150
OS_ADD: +2.75
OS_CYLINDER: -1.25
OS_VA1: 20/25+/-
OS_VA2: J1
OD_ADD: +2.50
OD_SPHERE: -0.50
OS_SPHERE: -0.50
OD_VA1: 20/20-1
OS_SPHERE: -0.50
OS_AXIS: 352
OS_CYLINDER: -1.00
OD_CYLINDER: -0.25
OD_CYLINDER: -0.50

## 2023-03-27 ASSESSMENT — VASCULARIZATION: OD_VASCULARIZATION: NASAL PANNUS

## 2023-03-27 ASSESSMENT — KERATOMETRY
OD_K2POWER_DIOPTERS: 45.00
OS_K1POWER_DIOPTERS: 42.75
OD_AXISANGLE_DEGREES: 075
OD_K1POWER_DIOPTERS: 42.75
OS_AXISANGLE_DEGREES: 111
OS_K2POWER_DIOPTERS: 45.25

## 2023-03-27 ASSESSMENT — REFRACTION_AUTOREFRACTION
OD_AXIS: 131
OS_AXIS: 051
OS_CYLINDER: -1.25
OD_CYLINDER: -0.50
OD_SPHERE: -0.25
OS_SPHERE: -0.25

## 2023-03-27 ASSESSMENT — TONOMETRY: OS_IOP_MMHG: 13

## 2023-03-27 ASSESSMENT — CONFRONTATIONAL VISUAL FIELD TEST (CVF)
OD_FINDINGS: FULL
OS_FINDINGS: FULL

## 2023-04-10 ENCOUNTER — APPOINTMENT (OUTPATIENT)
Dept: ORTHOPEDIC SURGERY | Facility: CLINIC | Age: 78
End: 2023-04-10
Payer: MEDICARE

## 2023-04-10 VITALS
HEIGHT: 63 IN | WEIGHT: 102 LBS | SYSTOLIC BLOOD PRESSURE: 126 MMHG | DIASTOLIC BLOOD PRESSURE: 79 MMHG | BODY MASS INDEX: 18.07 KG/M2

## 2023-04-10 PROCEDURE — 20610 DRAIN/INJ JOINT/BURSA W/O US: CPT | Mod: RT

## 2023-04-10 PROCEDURE — 99214 OFFICE O/P EST MOD 30 MIN: CPT | Mod: 25

## 2023-04-10 RX ORDER — MELOXICAM 15 MG/1
15 TABLET ORAL DAILY
Qty: 14 | Refills: 0 | Status: ACTIVE | COMMUNITY
Start: 2023-04-10 | End: 1900-01-01

## 2023-04-10 RX ORDER — PANTOPRAZOLE 40 MG/1
40 TABLET, DELAYED RELEASE ORAL DAILY
Qty: 14 | Refills: 0 | Status: ACTIVE | COMMUNITY
Start: 2023-04-10 | End: 1900-01-01

## 2023-04-10 NOTE — ASSESSMENT
[FreeTextEntry1] : I had a lengthy discussion with the patient in regards to treatment plan and diagnosis. There are no red flag findings on imaging nor are there any red flag findings on clinical exam.  Therefore we will proceed with a course of conservative treatment.  This would include physical therapy/home exercise program, Tylenol, NSAIDs as medically indicated. Rx Meloxicam and Tizanidine. \par A RT greater trochanteric cortisone injection was provided today in office for symptomatic relief. Patient tolerated procedure well. \par The patient will follow up with me in approximately 6 to 8 weeks.  I encouraged the patient to follow-up sooner if there are any new or worsening symptoms.

## 2023-04-10 NOTE — PROCEDURE
[de-identified] : Right Greater Trochanteric Injection\par Risks and benefits of the trochanteric bursa injection was discussed with the patient. Verbal consent was obtained. Area was prepped with Betadine. Local anesthetic spray was used. 2 cc of Depo-Medrol (4omg/cc) and 3 cc of lidocaine was injected in the posterior part of the greater trochanter Patient tolerated the procedure well. Band-Aid was applied.

## 2023-04-10 NOTE — ADDENDUM
[FreeTextEntry1] : I, Gardenia Munson, acted solely as a scribe for Dr. Nico Wilhelm MD on this date 04/10/2023  \par \par All medical record entries made by the Scribe were at my, Dr. Nico Wilhelm MD., direction and personally dictated by me on 04/10/2023 . I have reviewed the chart and agree that the record accurately reflects my personal performance of the history, physical exam, assessment and plan. I have also personally directed, reviewed, and agreed with the chart.

## 2023-04-10 NOTE — PHYSICAL EXAM
[de-identified] : Lumbar Physical Exam\par \par Gait - Normal\par \par Station - Normal\par \par Sagittal balance - Normal\par \par Compensatory mechanism? - None\par \par Reflexes\par Patellar - normal\par Gastroc - normal\par Clonus - No\par \par Hip Exam -decreased range of motion\par \par Straight leg raise - none\par \par Pulses - 2+ dp/pt\par \par Range of motion - normal\par \par Sensation \par Sensation intact to light touch in L1, L2, L3, L4, L5 and S1 dermatomes bilaterally\par \par Motor\par 	IP	Quad	HS	TA	Gastroc	EHL\par Right	5/5	5/5	5/5	5/5	5/5	5/5\par Left	5/5	5/5	5/5	5/5	5/5	5/5\par \par RT trochanter exam: pain with palpation \par  [de-identified] : Lumbar radiographs\par L4-L5 spondylolisthesis noted\par Facet arthropathy noted\par Disc degeneration\par Degenerative scoliosis noted\par \par Thoracic radiographs\par Thoracic spondylosis noted\par \par Lumbar MRI reviewed\par There is some lateral recess stenosis at L4-L5

## 2023-04-10 NOTE — HISTORY OF PRESENT ILLNESS
[de-identified] : 78 year old female who presents for follow-up evaluation of her RT hip pain. Patient reports pain has returned since last week. She reports inability to walk due to pain. She reports taking 2 Advil for pain with no relief. She is in office today to discuss possibly receiving another hip injection. \par Denies any saddle anesthesia. Denies any bowel/bladder incontinence.  \par \par 02/08/2023\par Today the patient states that she is still having some fairly severe bilateral hip pain.  This is lateral hip pain.  She denies any radiating type pain.  She felt like the last injection did help her.  She denies any bowel bladder issues.  She denies any saddle anesthesia.\par \par 11/14/22\par Today the patient states that her right hip pain has improved substantially.  She does have some right knee pain.  She felt like the injection helped her last time but unfortunately her symptoms did return after several weeks.  She denies any bowel bladder issues.  She denies any saddle anesthesia.  She denies any radiating type symptoms down her legs.  She denies any back pain.\par \par 10/06/22\par Today the patient states that she is dealing with some fairly severe right hip pain.  She describes pain that is over her right lateral thigh.  She denies any bowel bladder issues.  She denies any saddle anesthesia.  She does feel nauseous at times due to the severity of this pain.  She is eating and drinking well right now.  She has no significant abdominal pain.  He has no fevers or chills.\par \par 09/12/22\par Today the patient states that she is still dealing with some back pain but her radicular pain has improved after injections.  She denies any real radicular type pain today.  She does describe significant bilateral knee pain which is chronic in nature.  At her last appointment I did give her injections which she does feel helped her symptoms substantially.  She denies any bowel bladder issues.  She denies any saddle anesthesia.\par \par 04/13/22\par Today the patient states that overall her symptoms are unchanged.  She states that after her last injection she had significant improvement in her right thigh, lateral thigh pain.  She denies any bowel bladder issues.  She denies any saddle anesthesia.  She is still able to walk fairly well.  She is here today to discuss neck steps and treatment.  In particular she would like to pursue further physical therapy and another shot today.\par \par 02/09/22\par Today the patient states that overall she is doing well.  She states that the right lateral thigh pain that she had previously has gotten better with the greater trochanteric bursa injection she received at her last visit.  Currently she is dealing with knee pain.  She has known knee osteoarthritis.  She denies any bowel bladder issues.  She denies any saddle anesthesia.  She denies any focal areas of weakness or numbness.\par \par 01/13/22\par Today the patient states that she is doing well overall.  She walks approximately 1 mile a day.  She does have some right low back pain.  She also has some right lateral thigh pain.  She denies any bowel bladder issues.  She denies any saddle anesthesia.  She is here today to discuss her treatment options.\par \par 12/27/21\par Today the patient states that she is still having the same symptoms as last time.  She states the majority of her pain is in her low back today.  She denies any severe right lower extremity symptoms.  She did state that a few weeks ago she did have significant right lower extremity symptoms but not today.  She denies any bowel bladder issues.  She denies any saddle anesthesia.\par \par 11/10/21\par Today the patient states that she has had significant symptoms since last time I have seen her.  She did have severe right lower extremity symptoms.  She has pain with movement right lateral thigh.  she denies any bowel bladder issues.  She denies any saddle anesthesia.  \par \par 10/25/21\par This is a 76-year-old female here today for evaluation of her low back pain.  She denies any radicular pain down her legs.  She denies any weakness.  She has been dealing with this right-sided low back pain for 2+ years.  She denies any bowel bladder issues.  She denies any saddle anesthesia.

## 2023-04-12 RX ORDER — LIDOCAINE 5% 700 MG/1
5 PATCH TOPICAL
Qty: 10 | Refills: 0 | Status: ACTIVE | COMMUNITY
Start: 2023-04-12 | End: 1900-01-01

## 2023-04-12 RX ORDER — TIZANIDINE 2 MG/1
2 TABLET ORAL EVERY 6 HOURS
Qty: 56 | Refills: 0 | Status: ACTIVE | COMMUNITY
Start: 2023-04-12 | End: 1900-01-01

## 2023-04-19 PROBLEM — M25.551 RIGHT HIP PAIN: Status: ACTIVE | Noted: 2023-04-19

## 2023-04-20 ENCOUNTER — APPOINTMENT (OUTPATIENT)
Dept: ORTHOPEDIC SURGERY | Facility: CLINIC | Age: 78
End: 2023-04-20
Payer: MEDICARE

## 2023-04-20 VITALS
HEIGHT: 63 IN | OXYGEN SATURATION: 92 % | DIASTOLIC BLOOD PRESSURE: 78 MMHG | SYSTOLIC BLOOD PRESSURE: 167 MMHG | WEIGHT: 105 LBS | BODY MASS INDEX: 18.61 KG/M2 | HEART RATE: 67 BPM

## 2023-04-20 DIAGNOSIS — M25.551 PAIN IN RIGHT HIP: ICD-10-CM

## 2023-04-20 PROCEDURE — 99213 OFFICE O/P EST LOW 20 MIN: CPT

## 2023-04-20 PROCEDURE — 73502 X-RAY EXAM HIP UNI 2-3 VIEWS: CPT

## 2023-04-23 NOTE — PHYSICAL EXAM
[de-identified] : Constitutional: 78 year old female, alert and oriented, cooperative, in no acute distress.\par \par HEENT \par NC/AT.  Appearance: symmetric\par \par Neck/Back\par Straight without deformity or instability.  Good ROM.\par \par Chest/Respiratory \par Respiratory effort: no intercostal retractions or use of accessory muscles. Nonlabored Breathing\par \par Mental Status: \par Judgment, insight: intact\par Orientation: oriented to time, place, and person\par \par Neurological:\par Sensory and Motor are grossly intact throughout\par \par Right Hip Exam:\par \par Tenderness: \par 	Sacroiliac: Negative \par 	Greater trochanter: Negative  \par                 BREE Test: Negative\par                 FADIR Test: Positive, groin pain\par \par Range of Motion:\par                 Extension - 0 \par 	Flexion - 100 \par 	IR - 30 \par 	ER - 45\par 	Abd - 45 \par 	Add - 30 \par \par Neurologic Exam\par     Motor intact including 5/5 Extensor Hallucis Longus, 5/5 Flexor Hallucis Longus, 5/5 Tibialis Anterior and 5/5 Gastrocnemius\par     Sensation Intact to Light Touch including Saphenous, Sural, Superficial Peroneal, Deep Peroneal, Tibial nerve distributions\par \par Vascular Exam\par     Foot is warm and well perfused with 2+ Dorsalis Pedis Pulse\par \par No pain with range of motion of the left hip or bilateral knees. No lumbar paraspinal muscle tenderness.  [de-identified] : XRay: XRays of the Pelvis (1 View) and Right Hip (2 Views) taken in the office today and discussed with the patient. XRays demonstrate joint space narrowing in the hip joint with subchondral sclerosis, consistent with osteoarthritis, Tonnis Grade: 1. (my personal interpretation)

## 2023-04-23 NOTE — HISTORY OF PRESENT ILLNESS
[de-identified] : Ms. Sharp is a 78-year-old female presents the office for evaluation of her right hip pain.  Patient has been experiencing right hip pain over the lateral hip and abductors for the last 2 weeks.  She does have a history of lower back pain for the last 3 to 4 years and is currently in physical therapy, which helps with the pain.  Her hip pain is worse with walking and she reports some difficulty with walking.  She has not noticed any relieving factors.  Patient has tried lidocaine patches, which helped.  There is no reported tenderness over the hip.  She has tried Tylenol and Aleve.  She is unable to take much NSAIDs due to history of hypertension.  She has tried a greater trochanteric injection by Dr. Wilhelm, which did not significantly help.\par \par History: HTN, HLD

## 2023-04-23 NOTE — DISCUSSION/SUMMARY
[de-identified] : Ms. Sharp is a 78-year-old female presents to the office for evaluation of her right hip pain.  Patient has been experiencing hip pain for the last 2 weeks.  She previously received a greater trochanteric bursa injection by Dr. Wilhelm.  X-rays showed mild right hip osteoarthritis.  Examination showed a positive FADIR test, but otherwise good hip range of motion. Discussed with the patient the examination and imaging findings.  Discussed with the patient the management of her hip osteoarthritis, including physical therapy, anti-inflammatories, and injections.  Patient was given referral for physical therapy.  Patient will take Tylenol as needed for her pain control.  Patient will follow-up in 6 to 8 weeks for reevaluation and management.  Patient understanding and in agreement the plan.  All questions answered.\par \par Plan:\par -Physical Therapy\par -Tylenol as needed for pain control\par -Follow up in 6 to 8 weeks for reevaluation and management

## 2023-05-22 ENCOUNTER — APPOINTMENT (OUTPATIENT)
Dept: ORTHOPEDIC SURGERY | Facility: CLINIC | Age: 78
End: 2023-05-22
Payer: MEDICARE

## 2023-05-22 VITALS
WEIGHT: 105 LBS | SYSTOLIC BLOOD PRESSURE: 124 MMHG | HEIGHT: 63 IN | BODY MASS INDEX: 18.61 KG/M2 | DIASTOLIC BLOOD PRESSURE: 68 MMHG

## 2023-05-22 PROCEDURE — 99214 OFFICE O/P EST MOD 30 MIN: CPT

## 2023-05-22 NOTE — HISTORY OF PRESENT ILLNESS
[de-identified] : 78 year old female who presents for follow-up evaluation of her RT hip pain that she reports radiates to her knee. She reports seeing Dr. Henry who told her she has signs of beginning stages of hip arthritis. She reports her hip pain is exacerbated when she walks. She reports she is starting physical therapy for her hip. She also reports recent onset of LT shoulder pain. Patient reports she is using lidocaine patches for her hip. She reports she is walking 1 mille a day. She reports she will f/u with . \par Denies any bowel/bladder incontinence. Denies any saddle anesthesia. \par \par 04/10/2023\par 78 year old female who presents for follow-up evaluation of her RT hip pain. Patient reports pain has returned since last week. She reports inability to walk due to pain. She reports taking 2 Advil for pain with no relief. She is in office today to discuss possibly receiving another hip injection. \par Denies any saddle anesthesia. Denies any bowel/bladder incontinence.  \par \par 02/08/2023\par Today the patient states that she is still having some fairly severe bilateral hip pain.  This is lateral hip pain.  She denies any radiating type pain.  She felt like the last injection did help her.  She denies any bowel bladder issues.  She denies any saddle anesthesia.\par \par 11/14/22\par Today the patient states that her right hip pain has improved substantially.  She does have some right knee pain.  She felt like the injection helped her last time but unfortunately her symptoms did return after several weeks.  She denies any bowel bladder issues.  She denies any saddle anesthesia.  She denies any radiating type symptoms down her legs.  She denies any back pain.\par \par 10/06/22\par Today the patient states that she is dealing with some fairly severe right hip pain.  She describes pain that is over her right lateral thigh.  She denies any bowel bladder issues.  She denies any saddle anesthesia.  She does feel nauseous at times due to the severity of this pain.  She is eating and drinking well right now.  She has no significant abdominal pain.  He has no fevers or chills.\par \par 09/12/22\par Today the patient states that she is still dealing with some back pain but her radicular pain has improved after injections.  She denies any real radicular type pain today.  She does describe significant bilateral knee pain which is chronic in nature.  At her last appointment I did give her injections which she does feel helped her symptoms substantially.  She denies any bowel bladder issues.  She denies any saddle anesthesia.\par \par 04/13/22\par Today the patient states that overall her symptoms are unchanged.  She states that after her last injection she had significant improvement in her right thigh, lateral thigh pain.  She denies any bowel bladder issues.  She denies any saddle anesthesia.  She is still able to walk fairly well.  She is here today to discuss neck steps and treatment.  In particular she would like to pursue further physical therapy and another shot today.\par \par 02/09/22\par Today the patient states that overall she is doing well.  She states that the right lateral thigh pain that she had previously has gotten better with the greater trochanteric bursa injection she received at her last visit.  Currently she is dealing with knee pain.  She has known knee osteoarthritis.  She denies any bowel bladder issues.  She denies any saddle anesthesia.  She denies any focal areas of weakness or numbness.\par \par 01/13/22\par Today the patient states that she is doing well overall.  She walks approximately 1 mile a day.  She does have some right low back pain.  She also has some right lateral thigh pain.  She denies any bowel bladder issues.  She denies any saddle anesthesia.  She is here today to discuss her treatment options.\par \par 12/27/21\par Today the patient states that she is still having the same symptoms as last time.  She states the majority of her pain is in her low back today.  She denies any severe right lower extremity symptoms.  She did state that a few weeks ago she did have significant right lower extremity symptoms but not today.  She denies any bowel bladder issues.  She denies any saddle anesthesia.\par \par 11/10/21\par Today the patient states that she has had significant symptoms since last time I have seen her.  She did have severe right lower extremity symptoms.  She has pain with movement right lateral thigh.  she denies any bowel bladder issues.  She denies any saddle anesthesia.  \par \par 10/25/21\par This is a 76-year-old female here today for evaluation of her low back pain.  She denies any radicular pain down her legs.  She denies any weakness.  She has been dealing with this right-sided low back pain for 2+ years.  She denies any bowel bladder issues.  She denies any saddle anesthesia.

## 2023-05-22 NOTE — ADDENDUM
[FreeTextEntry1] : I, Gardenia Munson, acted solely as a scribe for Dr. Nico Wilhelm MD on this date 05/22/2023  \par \par All medical record entries made by the Scribe were at my, Dr. Nico Wilhelm MD., direction and personally dictated by me on 05/22/2023 . I have reviewed the chart and agree that the record accurately reflects my personal performance of the history, physical exam, assessment and plan. I have also personally directed, reviewed, and agreed with the chart.

## 2023-05-22 NOTE — PHYSICAL EXAM
[de-identified] : Lumbar Physical Exam\par \par Gait - Normal\par \par Station - Normal\par \par Sagittal balance - Normal\par \par Compensatory mechanism? - None\par \par Reflexes\par Patellar - normal\par Gastroc - normal\par Clonus - No\par \par Hip Exam -decreased range of motion\par \par Straight leg raise - none\par \par Pulses - 2+ dp/pt\par \par Range of motion - normal\par \par Sensation \par Sensation intact to light touch in L1, L2, L3, L4, L5 and S1 dermatomes bilaterally\par \par Motor\par 	IP	Quad	HS	TA	Gastroc	EHL\par Right	5/5	5/5	5/5	5/5	5/5	5/5\par Left	5/5	5/5	5/5	5/5	5/5	5/5\par \par RT trochanter exam: pain with palpation  [de-identified] : Lumbar radiographs\par L4-L5 spondylolisthesis noted\par Facet arthropathy noted\par Disc degeneration\par Degenerative scoliosis noted\par \par Thoracic radiographs\par Thoracic spondylosis noted\par \par Lumbar MRI reviewed\par There is some lateral recess stenosis at L4-L5\par \par Hip radiographs reviewed \par no fracture or acute abnormalities noted

## 2023-05-22 NOTE — ASSESSMENT
[FreeTextEntry1] : I had a lengthy discussion with the patient in regards to their treatment plan and diagnosis. Their symptoms have persisted despite the conservative management they have attempted thus far.  As a result I would like to proceed with a lumbar MRI.  In tandem with this they should begin a physical therapy/home therapy program.  The patient can take Tylenol/NSAIDs as needed for pain control if medically able to. I instructed the patient to continue to f/u with [facility] for acupuncture. I will have the patient follow-up in 2 weeks for repeat clinical evaluation.  I encouraged them to follow-up sooner if their symptoms worsen or change in any way. \par \par Conservative Treatment Statement\par The patient has tried the following treatments:\par Activity modification         	+\par Ice/Compression                    +\par Braces                                     -\par NSAIDS                                  +\par Physical Therapy                    +\par \par Please note the above modalities have been tried for 6+ weeks over the past 2 months.

## 2023-07-12 ENCOUNTER — APPOINTMENT (OUTPATIENT)
Dept: ORTHOPEDIC SURGERY | Facility: CLINIC | Age: 78
End: 2023-07-12
Payer: MEDICARE

## 2023-07-12 VITALS
WEIGHT: 105 LBS | HEIGHT: 63 IN | BODY MASS INDEX: 18.61 KG/M2 | DIASTOLIC BLOOD PRESSURE: 71 MMHG | SYSTOLIC BLOOD PRESSURE: 132 MMHG

## 2023-07-12 DIAGNOSIS — M25.561 PAIN IN RIGHT KNEE: ICD-10-CM

## 2023-07-12 DIAGNOSIS — M17.12 UNILATERAL PRIMARY OSTEOARTHRITIS, LEFT KNEE: ICD-10-CM

## 2023-07-12 PROCEDURE — 20610 DRAIN/INJ JOINT/BURSA W/O US: CPT | Mod: 50

## 2023-07-12 PROCEDURE — 99214 OFFICE O/P EST MOD 30 MIN: CPT | Mod: 25

## 2023-07-12 NOTE — HISTORY OF PRESENT ILLNESS
[de-identified] : 78 year old female who presents for follow-up evaluation of her back pain and RT hip pain and radicular sxs down to her knee. Today, the patient reports she feels better relative to her previous appt. She reports attending physical therapy and undergoing 3 rounds of acupuncture with great relief. She reports recent flare up of her b/l knee pain and is in office today to receive knee injections. \par \par 05/22/2023\par 78 year old female who presents for follow-up evaluation of her RT hip pain that she reports radiates to her knee. She reports seeing Dr. Henry who told her she has signs of beginning stages of hip arthritis. She reports her hip pain is exacerbated when she walks. She reports she is starting physical therapy for her hip. She also reports recent onset of LT shoulder pain. Patient reports she is using lidocaine patches for her hip. She reports she is walking 1 mille a day. She reports she will f/u with . \par Denies any bowel/bladder incontinence. Denies any saddle anesthesia. \par \par 04/10/2023\par 78 year old female who presents for follow-up evaluation of her RT hip pain. Patient reports pain has returned since last week. She reports inability to walk due to pain. She reports taking 2 Advil for pain with no relief. She is in office today to discuss possibly receiving another hip injection. \par Denies any saddle anesthesia. Denies any bowel/bladder incontinence.  \par \par 02/08/2023\par Today the patient states that she is still having some fairly severe bilateral hip pain.  This is lateral hip pain.  She denies any radiating type pain.  She felt like the last injection did help her.  She denies any bowel bladder issues.  She denies any saddle anesthesia.\par \par 11/14/22\par Today the patient states that her right hip pain has improved substantially.  She does have some right knee pain.  She felt like the injection helped her last time but unfortunately her symptoms did return after several weeks.  She denies any bowel bladder issues.  She denies any saddle anesthesia.  She denies any radiating type symptoms down her legs.  She denies any back pain.\par \par 10/06/22\par Today the patient states that she is dealing with some fairly severe right hip pain.  She describes pain that is over her right lateral thigh.  She denies any bowel bladder issues.  She denies any saddle anesthesia.  She does feel nauseous at times due to the severity of this pain.  She is eating and drinking well right now.  She has no significant abdominal pain.  He has no fevers or chills.\par \par 09/12/22\par Today the patient states that she is still dealing with some back pain but her radicular pain has improved after injections.  She denies any real radicular type pain today.  She does describe significant bilateral knee pain which is chronic in nature.  At her last appointment I did give her injections which she does feel helped her symptoms substantially.  She denies any bowel bladder issues.  She denies any saddle anesthesia.\par \par 04/13/22\par Today the patient states that overall her symptoms are unchanged.  She states that after her last injection she had significant improvement in her right thigh, lateral thigh pain.  She denies any bowel bladder issues.  She denies any saddle anesthesia.  She is still able to walk fairly well.  She is here today to discuss neck steps and treatment.  In particular she would like to pursue further physical therapy and another shot today.\par \par 02/09/22\par Today the patient states that overall she is doing well.  She states that the right lateral thigh pain that she had previously has gotten better with the greater trochanteric bursa injection she received at her last visit.  Currently she is dealing with knee pain.  She has known knee osteoarthritis.  She denies any bowel bladder issues.  She denies any saddle anesthesia.  She denies any focal areas of weakness or numbness.\par \par 01/13/22\par Today the patient states that she is doing well overall.  She walks approximately 1 mile a day.  She does have some right low back pain.  She also has some right lateral thigh pain.  She denies any bowel bladder issues.  She denies any saddle anesthesia.  She is here today to discuss her treatment options.\par \par 12/27/21\par Today the patient states that she is still having the same symptoms as last time.  She states the majority of her pain is in her low back today.  She denies any severe right lower extremity symptoms.  She did state that a few weeks ago she did have significant right lower extremity symptoms but not today.  She denies any bowel bladder issues.  She denies any saddle anesthesia.\par \par 11/10/21\par Today the patient states that she has had significant symptoms since last time I have seen her.  She did have severe right lower extremity symptoms.  She has pain with movement right lateral thigh.  she denies any bowel bladder issues.  She denies any saddle anesthesia.  \par \par 10/25/21\par This is a 76-year-old female here today for evaluation of her low back pain.  She denies any radicular pain down her legs.  She denies any weakness.  She has been dealing with this right-sided low back pain for 2+ years.  She denies any bowel bladder issues.  She denies any saddle anesthesia.

## 2023-07-12 NOTE — ADDENDUM
[FreeTextEntry1] : I, Gardenia Munson, acted solely as a scribe for Dr. Nico Wilhelm MD on this date 07/12/2023  \par \par All medical record entries made by the Scribe were at my, Dr. Nico Wilhelm MD., direction and personally dictated by me on 07/12/2023 . I have reviewed the chart and agree that the record accurately reflects my personal performance of the history, physical exam, assessment and plan. I have also personally directed, reviewed, and agreed with the chart.

## 2023-07-12 NOTE — ASSESSMENT
[FreeTextEntry1] : I had a lengthy discussion with the patient in regards to treatment plan and diagnosis. There are no red flag findings on imaging nor are there any red flag findings on clinical exams.  Therefore we will proceed with a course of conservative treatment. This would include physical therapy/home exercise program, Tylenol, NSAIDs as medically indicated. Instructed the patient to continue to follow-up with her pain management provider. The patient can continue to walk as much as possible and continue with her current home exercise program.  The patient will follow up with me in approximately 6 to 8 weeks.  I encouraged the patient to follow-up sooner if there are any new or worsening symptoms. \par \par b/l knee injections administered in office today, patient tolerated the procedure well.

## 2023-07-12 NOTE — PHYSICAL EXAM
[de-identified] : Lumbar Physical Exam\par \par Gait - Normal\par \par Station - Normal\par \par Sagittal balance - Normal\par \par Compensatory mechanism? - None\par \par Reflexes\par Patellar - normal\par Gastroc - normal\par Clonus - No\par \par Hip Exam -decreased range of motion\par \par Straight leg raise - none\par \par Pulses - 2+ dp/pt\par \par Range of motion - normal\par \par Sensation \par Sensation intact to light touch in L1, L2, L3, L4, L5 and S1 dermatomes bilaterally\par \par Motor\par 	IP	Quad	HS	TA	Gastroc	EHL\par Right	5/5	5/5	5/5	5/5	5/5	5/5\par Left	5/5	5/5	5/5	5/5	5/5	5/5  [de-identified] : Lumbar MRI reviewed \par There are areas of spinal stenosis but no critical areas, severe central stenosis which require any sort of aggressive treatment currently.\par \par \par \par Previous imaging: \par Lumbar radiographs\par L4-L5 spondylolisthesis noted\par Facet arthropathy noted\par Disc degeneration\par Degenerative scoliosis noted\par \par Thoracic radiographs\par Thoracic spondylosis noted\par \par Lumbar MRI reviewed\par There is some lateral recess stenosis at L4-L5\par \par Hip radiographs reviewed \par no fracture or acute abnormalities noted

## 2023-07-19 ENCOUNTER — NON-APPOINTMENT (OUTPATIENT)
Age: 78
End: 2023-07-19

## 2023-08-28 NOTE — ED ADULT NURSE NOTE - CAS DISCH TRANSFER METHOD
[Alert] : alert [No Acute Distress] : no acute distress [Normocephalic] : normocephalic [Conjunctivae with no discharge] : conjunctivae with no discharge [PERRL] : PERRL [EOMI Bilateral] : EOMI bilateral [Auricles Well Formed] : auricles well formed [Clear Tympanic membranes with present light reflex and bony landmarks] : clear tympanic membranes with present light reflex and bony landmarks [No Discharge] : no discharge [Nares Patent] : nares patent [Pink Nasal Mucosa] : pink nasal mucosa [Palate Intact] : palate intact [Nonerythematous Oropharynx] : nonerythematous oropharynx [Supple, full passive range of motion] : supple, full passive range of motion [No Palpable Masses] : no palpable masses [Symmetric Chest Rise] : symmetric chest rise [Clear to Auscultation Bilaterally] : clear to auscultation bilaterally [Normoactive Precordium] : normoactive precordium [Regular Rate and Rhythm] : regular rate and rhythm [Normal S1, S2 present] : normal S1, S2 present [No Murmurs] : no murmurs [+2 Femoral Pulses] : +2 femoral pulses [Soft] : soft [NonTender] : non tender [Non Distended] : non distended [Normoactive Bowel Sounds] : normoactive bowel sounds [No Hepatomegaly] : no hepatomegaly [No Splenomegaly] : no splenomegaly [Pratik: _____] : Partik [unfilled] [Uncircumcised] : uncircumcised [Testicles Descended Bilaterally] : testicles descended bilaterally [Patent] : patent [No fissures] : no fissures [No Abnormal Lymph Nodes Palpated] : no abnormal lymph nodes palpated Car seat [No Gait Asymmetry] : no gait asymmetry [No pain or deformities with palpation of bone, muscles, joints] : no pain or deformities with palpation of bone, muscles, joints [Normal Muscle Tone] : normal muscle tone [Straight] : straight [+2 Patella DTR] : +2 patella DTR [Cranial Nerves Grossly Intact] : cranial nerves grossly intact [No Rash or Lesions] : no rash or lesions [de-identified] : GROVER Corley

## 2023-09-06 ENCOUNTER — APPOINTMENT (OUTPATIENT)
Dept: ORTHOPEDIC SURGERY | Facility: CLINIC | Age: 78
End: 2023-09-06
Payer: MEDICARE

## 2023-09-06 VITALS
BODY MASS INDEX: 18.61 KG/M2 | HEIGHT: 63 IN | WEIGHT: 105 LBS | SYSTOLIC BLOOD PRESSURE: 125 MMHG | DIASTOLIC BLOOD PRESSURE: 71 MMHG

## 2023-09-06 PROCEDURE — 99214 OFFICE O/P EST MOD 30 MIN: CPT

## 2023-09-06 NOTE — HISTORY OF PRESENT ILLNESS
[de-identified] : 78 year old female who presents for follow-up evaluation of her lower back pain and RT hip pain and radicular sxs down to her b/l knees. Today, the patient reports she is still experiencing her b/l knee pain and hip pain. She reports starting acupuncture w/ Dr. March for her back pain with great relief.  Denies any bowel/bladder incontinence. Denies any saddle anesthesia.   07/12/2023 78 year old female who presents for follow-up evaluation of her back pain and RT hip pain and radicular sxs down to her knee. Today, the patient reports she feels better relative to her previous appt. She reports attending physical therapy and undergoing 3 rounds of acupuncture with great relief. She reports recent flare up of her b/l knee pain and is in office today to receive knee injections.   05/22/2023 78 year old female who presents for follow-up evaluation of her RT hip pain that she reports radiates to her knee. She reports seeing Dr. Henry who told her she has signs of beginning stages of hip arthritis. She reports her hip pain is exacerbated when she walks. She reports she is starting physical therapy for her hip. She also reports recent onset of LT shoulder pain. Patient reports she is using lidocaine patches for her hip. She reports she is walking 1 mille a day. She reports she will f/u with .  Denies any bowel/bladder incontinence. Denies any saddle anesthesia.   04/10/2023 78 year old female who presents for follow-up evaluation of her RT hip pain. Patient reports pain has returned since last week. She reports inability to walk due to pain. She reports taking 2 Advil for pain with no relief. She is in office today to discuss possibly receiving another hip injection.  Denies any saddle anesthesia. Denies any bowel/bladder incontinence.    02/08/2023 Today the patient states that she is still having some fairly severe bilateral hip pain.  This is lateral hip pain.  She denies any radiating type pain.  She felt like the last injection did help her.  She denies any bowel bladder issues.  She denies any saddle anesthesia.  11/14/22 Today the patient states that her right hip pain has improved substantially.  She does have some right knee pain.  She felt like the injection helped her last time but unfortunately her symptoms did return after several weeks.  She denies any bowel bladder issues.  She denies any saddle anesthesia.  She denies any radiating type symptoms down her legs.  She denies any back pain.  10/06/22 Today the patient states that she is dealing with some fairly severe right hip pain.  She describes pain that is over her right lateral thigh.  She denies any bowel bladder issues.  She denies any saddle anesthesia.  She does feel nauseous at times due to the severity of this pain.  She is eating and drinking well right now.  She has no significant abdominal pain.  He has no fevers or chills.  09/12/22 Today the patient states that she is still dealing with some back pain but her radicular pain has improved after injections.  She denies any real radicular type pain today.  She does describe significant bilateral knee pain which is chronic in nature.  At her last appointment I did give her injections which she does feel helped her symptoms substantially.  She denies any bowel bladder issues.  She denies any saddle anesthesia.  04/13/22 Today the patient states that overall her symptoms are unchanged.  She states that after her last injection she had significant improvement in her right thigh, lateral thigh pain.  She denies any bowel bladder issues.  She denies any saddle anesthesia.  She is still able to walk fairly well.  She is here today to discuss neck steps and treatment.  In particular she would like to pursue further physical therapy and another shot today.  02/09/22 Today the patient states that overall she is doing well.  She states that the right lateral thigh pain that she had previously has gotten better with the greater trochanteric bursa injection she received at her last visit.  Currently she is dealing with knee pain.  She has known knee osteoarthritis.  She denies any bowel bladder issues.  She denies any saddle anesthesia.  She denies any focal areas of weakness or numbness.  01/13/22 Today the patient states that she is doing well overall.  She walks approximately 1 mile a day.  She does have some right low back pain.  She also has some right lateral thigh pain.  She denies any bowel bladder issues.  She denies any saddle anesthesia.  She is here today to discuss her treatment options.  12/27/21 Today the patient states that she is still having the same symptoms as last time.  She states the majority of her pain is in her low back today.  She denies any severe right lower extremity symptoms.  She did state that a few weeks ago she did have significant right lower extremity symptoms but not today.  She denies any bowel bladder issues.  She denies any saddle anesthesia.  11/10/21 Today the patient states that she has had significant symptoms since last time I have seen her.  She did have severe right lower extremity symptoms.  She has pain with movement right lateral thigh.  she denies any bowel bladder issues.  She denies any saddle anesthesia.    10/25/21 This is a 76-year-old female here today for evaluation of her low back pain.  She denies any radicular pain down her legs.  She denies any weakness.  She has been dealing with this right-sided low back pain for 2+ years.  She denies any bowel bladder issues.  She denies any saddle anesthesia.

## 2023-09-06 NOTE — PHYSICAL EXAM
[de-identified] : Lumbar Physical Exam\par  \par  Gait - Normal\par  \par  Station - Normal\par  \par  Sagittal balance - Normal\par  \par  Compensatory mechanism? - None\par  \par  Reflexes\par  Patellar - normal\par  Gastroc - normal\par  Clonus - No\par  \par  Hip Exam -decreased range of motion\par  \par  Straight leg raise - none\par  \par  Pulses - 2+ dp/pt\par  \par  Range of motion - normal\par  \par  Sensation \par  Sensation intact to light touch in L1, L2, L3, L4, L5 and S1 dermatomes bilaterally\par  \par  Motor\par  	IP	Quad	HS	TA	Gastroc	EHL\par  Right	5/5	5/5	5/5	5/5	5/5	5/5\par  Left	5/5	5/5	5/5	5/5	5/5	5/5  [de-identified] : Lumbar MRI reviewed \par  There are areas of spinal stenosis but no critical areas, severe central stenosis which require any sort of aggressive treatment currently.\par  \par  \par  \par  Previous imaging: \par  Lumbar radiographs\par  L4-L5 spondylolisthesis noted\par  Facet arthropathy noted\par  Disc degeneration\par  Degenerative scoliosis noted\par  \par  Thoracic radiographs\par  Thoracic spondylosis noted\par  \par  Lumbar MRI reviewed\par  There is some lateral recess stenosis at L4-L5\par  \par  Hip radiographs reviewed \par  no fracture or acute abnormalities noted

## 2023-09-06 NOTE — ASSESSMENT
[FreeTextEntry1] : I had a lengthy discussion with the patient in regards to treatment plan and diagnosis. There are no red flag findings on imaging nor are there any red flag findings on clinical exams.  Therefore we will proceed with a course of conservative treatment. This would include physical therapy/home exercise program, Tylenol, NSAIDs as medically indicated. Instructed the patient to continue to follow-up with Dr. March for acupuncture. The patient can continue to walk as much as possible and continue with her current home exercise program.  The patient will follow up with me in approximately 6 to 8 weeks, where we will administer b/l trochanter injections prior to her trip.  I encouraged the patient to follow-up sooner if there are any new or worsening symptoms.

## 2023-09-06 NOTE — ADDENDUM
[FreeTextEntry1] : I, Gardenia Munson, acted solely as a scribe for Dr. Nico Wilhelm MD on this date 09/06/2023    All medical record entries made by the Scribe were at my, Dr. Nico Wilhelm MD., direction and personally dictated by me on 09/06/2023 . I have reviewed the chart and agree that the record accurately reflects my personal performance of the history, physical exam, assessment and plan. I have also personally directed, reviewed, and agreed with the chart.

## 2023-09-25 PROBLEM — M25.561 BILATERAL KNEE PAIN: Status: ACTIVE | Noted: 2023-09-25

## 2023-09-26 ENCOUNTER — APPOINTMENT (OUTPATIENT)
Dept: ORTHOPEDIC SURGERY | Facility: CLINIC | Age: 78
End: 2023-09-26
Payer: MEDICARE

## 2023-09-26 DIAGNOSIS — M25.561 PAIN IN RIGHT KNEE: ICD-10-CM

## 2023-09-26 DIAGNOSIS — M25.562 PAIN IN RIGHT KNEE: ICD-10-CM

## 2023-09-26 PROCEDURE — 20610 DRAIN/INJ JOINT/BURSA W/O US: CPT | Mod: 50

## 2023-09-26 PROCEDURE — 99213 OFFICE O/P EST LOW 20 MIN: CPT | Mod: 25

## 2023-09-26 PROCEDURE — 72170 X-RAY EXAM OF PELVIS: CPT

## 2023-09-26 PROCEDURE — 73564 X-RAY EXAM KNEE 4 OR MORE: CPT | Mod: 50

## 2023-09-26 RX ORDER — HYLAN G-F 20 16MG/2ML
48 SYRINGE (ML) INTRAARTICULAR
Qty: 2 | Refills: 0 | Status: ACTIVE | COMMUNITY
Start: 2023-09-26

## 2023-09-27 ENCOUNTER — APPOINTMENT (OUTPATIENT)
Dept: ORTHOPEDIC SURGERY | Facility: CLINIC | Age: 78
End: 2023-09-27
Payer: MEDICARE

## 2023-09-27 VITALS
DIASTOLIC BLOOD PRESSURE: 81 MMHG | BODY MASS INDEX: 17.93 KG/M2 | SYSTOLIC BLOOD PRESSURE: 153 MMHG | WEIGHT: 105 LBS | HEIGHT: 64 IN

## 2023-09-27 DIAGNOSIS — M54.50 LOW BACK PAIN, UNSPECIFIED: ICD-10-CM

## 2023-09-27 PROCEDURE — 20552 NJX 1/MLT TRIGGER POINT 1/2: CPT

## 2023-09-27 PROCEDURE — 99214 OFFICE O/P EST MOD 30 MIN: CPT | Mod: 25

## 2023-10-27 ENCOUNTER — OFFICE (OUTPATIENT)
Dept: URBAN - METROPOLITAN AREA CLINIC 90 | Facility: CLINIC | Age: 78
Setting detail: OPHTHALMOLOGY
End: 2023-10-27
Payer: MEDICARE

## 2023-10-27 DIAGNOSIS — H43.812: ICD-10-CM

## 2023-10-27 DIAGNOSIS — H35.373: ICD-10-CM

## 2023-10-27 DIAGNOSIS — H43.392: ICD-10-CM

## 2023-10-27 DIAGNOSIS — H35.363: ICD-10-CM

## 2023-10-27 DIAGNOSIS — H26.491: ICD-10-CM

## 2023-10-27 DIAGNOSIS — H40.013: ICD-10-CM

## 2023-10-27 DIAGNOSIS — D31.32: ICD-10-CM

## 2023-10-27 DIAGNOSIS — H16.421: ICD-10-CM

## 2023-10-27 DIAGNOSIS — H18.452: ICD-10-CM

## 2023-10-27 PROCEDURE — 92014 COMPRE OPH EXAM EST PT 1/>: CPT | Performed by: OPHTHALMOLOGY

## 2023-10-27 PROCEDURE — 92083 EXTENDED VISUAL FIELD XM: CPT | Performed by: OPHTHALMOLOGY

## 2023-10-27 ASSESSMENT — AXIALLENGTH_DERIVED
OD_AL: 23.7446
OS_AL: 23.8436
OD_AL: 23.6953
OS_AL: 23.9435
OD_AL: 23.5975
OS_AL: 23.8935

## 2023-10-27 ASSESSMENT — TONOMETRY
OS_IOP_MMHG: 12
OD_IOP_MMHG: 12

## 2023-10-27 ASSESSMENT — REFRACTION_MANIFEST
OD_VA2: J1
OS_CYLINDER: -1.00
OS_AXIS: 025
OD_CYLINDER: -0.50
OS_ADD: +2.50
OD_SPHERE: -0.50
OS_VA2: J1
OD_ADD: +2.50
OS_VA1: 20/25+/-
OD_AXIS: 150
OS_ADD: +2.75
OD_ADD: +2.50
OD_VA2: 20/20(J1+)
OS_VA1: 20/25+
OS_SPHERE: -0.50
OD_CYLINDER: -0.25
OS_SPHERE: -0.50
OS_VA2: 20/20(J1+)
OD_VA1: 20/20-1
OS_CYLINDER: -1.25
OD_AXIS: 150
OS_AXIS: 352
OD_SPHERE: -0.25
OD_VA1: 20/20-1

## 2023-10-27 ASSESSMENT — KERATOMETRY
OS_K1POWER_DIOPTERS: 42.50
OS_K2POWER_DIOPTERS: 45.00
OD_AXISANGLE_DEGREES: 093
OD_K1POWER_DIOPTERS: 42.75
OS_AXISANGLE_DEGREES: 113
OD_K2POWER_DIOPTERS: 44.75

## 2023-10-27 ASSESSMENT — CONFRONTATIONAL VISUAL FIELD TEST (CVF)
OS_FINDINGS: FULL
OD_FINDINGS: FULL

## 2023-10-27 ASSESSMENT — REFRACTION_AUTOREFRACTION
OS_CYLINDER: -1.25
OD_AXIS: 000
OD_SPHERE: -0.25
OS_SPHERE: -0.25
OS_AXIS: 042
OD_CYLINDER: 0.00

## 2023-10-27 ASSESSMENT — VISUAL ACUITY
OS_BCVA: 20/20-2
OD_BCVA: 20/25+2

## 2023-10-27 ASSESSMENT — SPHEQUIV_DERIVED
OS_SPHEQUIV: -0.875
OD_SPHEQUIV: -0.625
OD_SPHEQUIV: -0.5
OD_SPHEQUIV: -0.25
OS_SPHEQUIV: -1
OS_SPHEQUIV: -1.125

## 2023-10-27 ASSESSMENT — VASCULARIZATION: OD_VASCULARIZATION: NASAL PANNUS

## 2023-11-16 ENCOUNTER — APPOINTMENT (OUTPATIENT)
Dept: ORTHOPEDIC SURGERY | Facility: CLINIC | Age: 78
End: 2023-11-16
Payer: MEDICARE

## 2023-11-16 VITALS
SYSTOLIC BLOOD PRESSURE: 152 MMHG | HEIGHT: 64 IN | WEIGHT: 105 LBS | DIASTOLIC BLOOD PRESSURE: 83 MMHG | BODY MASS INDEX: 17.93 KG/M2

## 2023-11-16 DIAGNOSIS — M70.61 TROCHANTERIC BURSITIS, RIGHT HIP: ICD-10-CM

## 2023-11-16 PROCEDURE — 99214 OFFICE O/P EST MOD 30 MIN: CPT | Mod: 25

## 2023-11-16 PROCEDURE — 20610 DRAIN/INJ JOINT/BURSA W/O US: CPT | Mod: RT

## 2024-01-12 ENCOUNTER — APPOINTMENT (OUTPATIENT)
Dept: ORTHOPEDIC SURGERY | Facility: CLINIC | Age: 79
End: 2024-01-12

## 2024-02-09 ENCOUNTER — APPOINTMENT (OUTPATIENT)
Dept: ORTHOPEDIC SURGERY | Facility: CLINIC | Age: 79
End: 2024-02-09
Payer: MEDICARE

## 2024-02-09 DIAGNOSIS — M17.11 UNILATERAL PRIMARY OSTEOARTHRITIS, RIGHT KNEE: ICD-10-CM

## 2024-02-09 PROCEDURE — 99213 OFFICE O/P EST LOW 20 MIN: CPT

## 2024-02-11 PROBLEM — M17.11 RIGHT KNEE DJD: Status: ACTIVE | Noted: 2023-11-16

## 2024-03-11 ENCOUNTER — APPOINTMENT (OUTPATIENT)
Dept: ORTHOPEDIC SURGERY | Facility: CLINIC | Age: 79
End: 2024-03-11

## 2024-07-03 ENCOUNTER — OFFICE (OUTPATIENT)
Dept: URBAN - METROPOLITAN AREA CLINIC 90 | Facility: CLINIC | Age: 79
Setting detail: OPHTHALMOLOGY
End: 2024-07-03
Payer: MEDICARE

## 2024-07-03 DIAGNOSIS — H26.491: ICD-10-CM

## 2024-07-03 DIAGNOSIS — H16.421: ICD-10-CM

## 2024-07-03 DIAGNOSIS — H35.373: ICD-10-CM

## 2024-07-03 DIAGNOSIS — H40.013: ICD-10-CM

## 2024-07-03 DIAGNOSIS — D31.32: ICD-10-CM

## 2024-07-03 DIAGNOSIS — H35.363: ICD-10-CM

## 2024-07-03 DIAGNOSIS — H43.813: ICD-10-CM

## 2024-07-03 DIAGNOSIS — H18.452: ICD-10-CM

## 2024-07-03 DIAGNOSIS — H43.392: ICD-10-CM

## 2024-07-03 PROCEDURE — 92014 COMPRE OPH EXAM EST PT 1/>: CPT | Performed by: OPHTHALMOLOGY

## 2024-07-03 PROCEDURE — 92250 FUNDUS PHOTOGRAPHY W/I&R: CPT | Performed by: OPHTHALMOLOGY

## 2024-07-03 ASSESSMENT — CONFRONTATIONAL VISUAL FIELD TEST (CVF)
OS_FINDINGS: FULL
OD_FINDINGS: FULL

## 2024-09-16 ENCOUNTER — APPOINTMENT (OUTPATIENT)
Dept: ORTHOPEDIC SURGERY | Facility: CLINIC | Age: 79
End: 2024-09-16

## 2024-09-16 DIAGNOSIS — M70.61 TROCHANTERIC BURSITIS, RIGHT HIP: ICD-10-CM

## 2024-09-16 PROCEDURE — 99214 OFFICE O/P EST MOD 30 MIN: CPT | Mod: 25

## 2024-09-16 PROCEDURE — 20610 DRAIN/INJ JOINT/BURSA W/O US: CPT | Mod: RT

## 2024-09-16 NOTE — PHYSICAL EXAM
[de-identified] : Lumbar Physical Exam  Gait - Normal  Station - Normal  Sagittal balance - Normal  Compensatory mechanism? - None  Reflexes Patellar - normal Gastroc - normal Clonus - No  Hip Exam -decreased range of motion  Straight leg raise - none  Pulses - 2+ dp/pt  Range of motion - normal  Sensation  Sensation intact to light touch in L1, L2, L3, L4, L5 and S1 dermatomes bilaterally  Motor 	IP	Quad	HS	TA	Gastroc	EHL Right	5/5	5/5	5/5	5/5	5/5	5/5 Left	5/5	5/5	5/5	5/5	5/5	5/5  [de-identified] : Lumbar MRI reviewed \par  There are areas of spinal stenosis but no critical areas, severe central stenosis which require any sort of aggressive treatment currently.\par  \par  \par  \par  Previous imaging: \par  Lumbar radiographs\par  L4-L5 spondylolisthesis noted\par  Facet arthropathy noted\par  Disc degeneration\par  Degenerative scoliosis noted\par  \par  Thoracic radiographs\par  Thoracic spondylosis noted\par  \par  Lumbar MRI reviewed\par  There is some lateral recess stenosis at L4-L5\par  \par  Hip radiographs reviewed \par  no fracture or acute abnormalities noted

## 2024-09-16 NOTE — ADDENDUM
[FreeTextEntry1] : I, Elaine Valdez, acted solely as a scribe for Dr. Nico Wilhelm MD on this date 09/16/2024 .   All medical record entries made by the Scribe were at my, Dr. Nico Wilhelm MD., direction and personally dictated by me on 03/06/2024. I have reviewed the chart and agree that the record accurately reflects my personal performance of the history, physical exam, assessment and plan. I have also personally directed, reviewed, and agreed with the chart.

## 2024-09-16 NOTE — PROCEDURE
[Injection] : Injection [Inflammation] : Inflammation [Patient] : patient [Risk] : Risk [Benefits] : benefits [Alcohol] : Alcohol [Ethyl Chloride Spray] : ethyl chloride spray was used as a topical anesthetic [22] : a 22-gauge [1% Lidocaine___(mL)] : [unfilled] mL of 1% Lidocaine [Methylpred. 40mg/mL___(mL)] : [unfilled] mL 40mg/mL methylprednisolone [Bandage Applied] : a bandage [Tolerated Well] : The patient tolerated the procedure well [de-identified] : Procedure: Injection of the right.greater trochanteric bursa Indication:  Inflammation. Risk and benefits were discussed with the. Alcohol was used to prep the area. Using sterile technique, the aspiration/injection needle was then directed from a lateral aspect. An 18-gauge was used to inject  mL of 1% Lidocaine and  mL 40mg/mL methylprednisolone. A bandage was applied. The patient tolerated the procedure well. Complications: none. [de-identified] : Right lower back [de-identified] : Lower back, trigger point

## 2024-09-16 NOTE — ASSESSMENT
[FreeTextEntry1] : I had a lengthy discussion with the patient in regards to treatment plan and diagnosis. There are no red flag findings on imaging nor are there any red flag findings on clinical exam.  Therefore we will proceed with a course of conservative treatment.  This would include physical therapy/home exercise program, Tylenol, NSAIDs as medically indicated. At this time, she has elected to receive an injection into the right greater trochanteric bursa today, which was well-tolerated by the patient. The patient will follow up with me in approximately 4-6 weeks.  I encouraged the patient to follow-up sooner if there are any new or worsening symptoms.

## 2024-09-16 NOTE — HISTORY OF PRESENT ILLNESS
[de-identified] : 77 yo female following up for her low back pain and right lateral thigh pain. She states that the right lateral thigh pain that she had previously has gotten better with the greater trochanteric bursa injection she received at past visits and would like to proceed with repeat greater trochanteric bursa injection administration today. Currently she is dealing with bilateral knee pain, which she is currently seeing a pain management specialist for. She has known knee osteoarthritis. Patient reports she's able to walk a mile and a half, however endorses difficulty with use of stairs. She denies any bowel bladder issues. She denies any saddle anesthesia. She denies any focal areas of weakness or numbness.  09/27/23 77 yo female following up for her low back pain.  Today the patient states she has no radicular pain.  She does have some focal right lower back pain.  She denies any bowel bladder issues.  She denies any saddle anesthesia.  She denies any issues with radiating type symptoms.  9/6/2023 78 year old female who presents for follow-up evaluation of her lower back pain and RT hip pain and radicular sxs down to her b/l knees. Today, the patient reports she is still experiencing her b/l knee pain and hip pain. She reports starting acupuncture w/ Dr. March for her back pain with great relief.  Denies any bowel/bladder incontinence. Denies any saddle anesthesia.   07/12/2023 78 year old female who presents for follow-up evaluation of her back pain and RT hip pain and radicular sxs down to her knee. Today, the patient reports she feels better relative to her previous appt. She reports attending physical therapy and undergoing 3 rounds of acupuncture with great relief. She reports recent flare up of her b/l knee pain and is in office today to receive knee injections.   05/22/2023 78 year old female who presents for follow-up evaluation of her RT hip pain that she reports radiates to her knee. She reports seeing Dr. Henry who told her she has signs of beginning stages of hip arthritis. She reports her hip pain is exacerbated when she walks. She reports she is starting physical therapy for her hip. She also reports recent onset of LT shoulder pain. Patient reports she is using lidocaine patches for her hip. She reports she is walking 1 mille a day. She reports she will f/u with .  Denies any bowel/bladder incontinence. Denies any saddle anesthesia.   04/10/2023 78 year old female who presents for follow-up evaluation of her RT hip pain. Patient reports pain has returned since last week. She reports inability to walk due to pain. She reports taking 2 Advil for pain with no relief. She is in office today to discuss possibly receiving another hip injection.  Denies any saddle anesthesia. Denies any bowel/bladder incontinence.    02/08/2023 Today the patient states that she is still having some fairly severe bilateral hip pain.  This is lateral hip pain.  She denies any radiating type pain.  She felt like the last injection did help her.  She denies any bowel bladder issues.  She denies any saddle anesthesia.  11/14/22 Today the patient states that her right hip pain has improved substantially.  She does have some right knee pain.  She felt like the injection helped her last time but unfortunately her symptoms did return after several weeks.  She denies any bowel bladder issues.  She denies any saddle anesthesia.  She denies any radiating type symptoms down her legs.  She denies any back pain.  10/06/22 Today the patient states that she is dealing with some fairly severe right hip pain.  She describes pain that is over her right lateral thigh.  She denies any bowel bladder issues.  She denies any saddle anesthesia.  She does feel nauseous at times due to the severity of this pain.  She is eating and drinking well right now.  She has no significant abdominal pain.  He has no fevers or chills.  09/12/22 Today the patient states that she is still dealing with some back pain but her radicular pain has improved after injections.  She denies any real radicular type pain today.  She does describe significant bilateral knee pain which is chronic in nature.  At her last appointment I did give her injections which she does feel helped her symptoms substantially.  She denies any bowel bladder issues.  She denies any saddle anesthesia.  04/13/22 Today the patient states that overall her symptoms are unchanged.  She states that after her last injection she had significant improvement in her right thigh, lateral thigh pain.  She denies any bowel bladder issues.  She denies any saddle anesthesia.  She is still able to walk fairly well.  She is here today to discuss neck steps and treatment.  In particular she would like to pursue further physical therapy and another shot today.  02/09/22 Today the patient states that overall she is doing well.  She states that the right lateral thigh pain that she had previously has gotten better with the greater trochanteric bursa injection she received at her last visit.  Currently she is dealing with knee pain.  She has known knee osteoarthritis.  She denies any bowel bladder issues.  She denies any saddle anesthesia.  She denies any focal areas of weakness or numbness.  01/13/22 Today the patient states that she is doing well overall.  She walks approximately 1 mile a day.  She does have some right low back pain.  She also has some right lateral thigh pain.  She denies any bowel bladder issues.  She denies any saddle anesthesia.  She is here today to discuss her treatment options.  12/27/21 Today the patient states that she is still having the same symptoms as last time.  She states the majority of her pain is in her low back today.  She denies any severe right lower extremity symptoms.  She did state that a few weeks ago she did have significant right lower extremity symptoms but not today.  She denies any bowel bladder issues.  She denies any saddle anesthesia.  11/10/21 Today the patient states that she has had significant symptoms since last time I have seen her.  She did have severe right lower extremity symptoms.  She has pain with movement right lateral thigh.  she denies any bowel bladder issues.  She denies any saddle anesthesia.    10/25/21 This is a 76-year-old female here today for evaluation of her low back pain.  She denies any radicular pain down her legs.  She denies any weakness.  She has been dealing with this right-sided low back pain for 2+ years.  She denies any bowel bladder issues.  She denies any saddle anesthesia.

## 2024-10-09 ENCOUNTER — APPOINTMENT (OUTPATIENT)
Dept: ORTHOPEDIC SURGERY | Facility: CLINIC | Age: 79
End: 2024-10-09
Payer: MEDICARE

## 2024-10-09 ENCOUNTER — APPOINTMENT (OUTPATIENT)
Dept: ORTHOPEDIC SURGERY | Facility: CLINIC | Age: 79
End: 2024-10-09

## 2024-10-09 DIAGNOSIS — M54.50 LOW BACK PAIN, UNSPECIFIED: ICD-10-CM

## 2024-10-09 DIAGNOSIS — M25.559 PAIN IN UNSPECIFIED HIP: ICD-10-CM

## 2024-10-09 DIAGNOSIS — G89.29 PAIN IN UNSPECIFIED HIP: ICD-10-CM

## 2024-10-09 PROCEDURE — 20611 DRAIN/INJ JOINT/BURSA W/US: CPT | Mod: RT

## 2024-10-09 PROCEDURE — 99214 OFFICE O/P EST MOD 30 MIN: CPT

## 2024-10-09 RX ORDER — METHYLPREDNISOLONE 4 MG/1
4 TABLET ORAL
Qty: 1 | Refills: 0 | Status: ACTIVE | COMMUNITY
Start: 2024-10-09 | End: 1900-01-01

## 2024-10-11 ENCOUNTER — NON-APPOINTMENT (OUTPATIENT)
Age: 79
End: 2024-10-11

## 2024-10-15 ENCOUNTER — NON-APPOINTMENT (OUTPATIENT)
Age: 79
End: 2024-10-15

## 2024-10-15 RX ORDER — LIDOCAINE 5% 700 MG/1
5 PATCH TOPICAL DAILY
Qty: 30 | Refills: 1 | Status: ACTIVE | COMMUNITY
Start: 2024-10-15 | End: 1900-01-01

## 2024-10-15 RX ORDER — TIZANIDINE 2 MG/1
2 TABLET ORAL EVERY 6 HOURS
Qty: 56 | Refills: 1 | Status: ACTIVE | COMMUNITY
Start: 2024-10-15 | End: 1900-01-01

## 2024-10-15 RX ORDER — MELOXICAM 15 MG/1
15 TABLET ORAL DAILY
Qty: 25 | Refills: 0 | Status: ACTIVE | COMMUNITY
Start: 2024-10-15 | End: 1900-01-01

## 2024-10-17 ENCOUNTER — NON-APPOINTMENT (OUTPATIENT)
Age: 79
End: 2024-10-17

## 2024-10-19 PROBLEM — M25.559 HIP PAIN, CHRONIC: Status: ACTIVE | Noted: 2024-10-19

## 2024-12-02 ENCOUNTER — APPOINTMENT (OUTPATIENT)
Dept: ORTHOPEDIC SURGERY | Facility: CLINIC | Age: 79
End: 2024-12-02
Payer: MEDICARE

## 2024-12-02 DIAGNOSIS — M54.50 LOW BACK PAIN, UNSPECIFIED: ICD-10-CM

## 2024-12-02 PROCEDURE — 20610 DRAIN/INJ JOINT/BURSA W/O US: CPT | Mod: RT

## 2024-12-02 PROCEDURE — 99214 OFFICE O/P EST MOD 30 MIN: CPT | Mod: 25

## 2024-12-02 RX ORDER — OXYCODONE 5 MG/1
5 TABLET ORAL 4 TIMES DAILY
Qty: 20 | Refills: 0 | Status: ACTIVE | COMMUNITY
Start: 2024-12-02 | End: 1900-01-01

## 2024-12-02 RX ORDER — ONDANSETRON 4 MG/1
4 TABLET ORAL
Qty: 15 | Refills: 0 | Status: ACTIVE | COMMUNITY
Start: 2024-12-02 | End: 1900-01-01

## 2024-12-16 ENCOUNTER — OFFICE (OUTPATIENT)
Facility: LOCATION | Age: 79
Setting detail: OPHTHALMOLOGY
End: 2024-12-16
Payer: MEDICARE

## 2024-12-16 DIAGNOSIS — H43.813: ICD-10-CM

## 2024-12-16 DIAGNOSIS — H26.491: ICD-10-CM

## 2024-12-16 DIAGNOSIS — H16.421: ICD-10-CM

## 2024-12-16 DIAGNOSIS — H18.452: ICD-10-CM

## 2024-12-16 DIAGNOSIS — H35.363: ICD-10-CM

## 2024-12-16 DIAGNOSIS — H40.013: ICD-10-CM

## 2024-12-16 DIAGNOSIS — D31.32: ICD-10-CM

## 2024-12-16 DIAGNOSIS — H43.392: ICD-10-CM

## 2024-12-16 DIAGNOSIS — H35.373: ICD-10-CM

## 2024-12-16 PROCEDURE — 92083 EXTENDED VISUAL FIELD XM: CPT | Performed by: OPHTHALMOLOGY

## 2024-12-16 PROCEDURE — 92133 CPTRZD OPH DX IMG PST SGM ON: CPT | Performed by: OPHTHALMOLOGY

## 2024-12-16 PROCEDURE — 92014 COMPRE OPH EXAM EST PT 1/>: CPT | Performed by: OPHTHALMOLOGY

## 2024-12-16 ASSESSMENT — KERATOMETRY
OD_K1POWER_DIOPTERS: 42.75
OD_K2POWER_DIOPTERS: 44.25
OD_AXISANGLE_DEGREES: 079
OS_AXISANGLE_DEGREES: 115
OS_K1POWER_DIOPTERS: 42.25
OS_K2POWER_DIOPTERS: 45.25

## 2024-12-16 ASSESSMENT — REFRACTION_MANIFEST
OD_SPHERE: -0.25
OD_CYLINDER: -0.25
OD_SPHERE: -0.50
OS_ADD: +2.50
OS_VA2: 20/20(J1+)
OS_CYLINDER: -1.00
OD_AXIS: 150
OS_VA1: 20/25+/-
OD_VA1: 20/20-1
OD_ADD: +2.50
OS_SPHERE: -0.50
OD_AXIS: 150
OS_SPHERE: -0.50
OS_AXIS: 352
OD_CYLINDER: -0.50
OD_VA1: 20/20-1
OS_VA2: J1
OD_VA2: 20/20(J1+)
OS_CYLINDER: -1.25
OS_AXIS: 025
OS_VA1: 20/25+
OS_ADD: +2.75
OD_VA2: J1
OD_ADD: +2.50

## 2024-12-16 ASSESSMENT — TONOMETRY
OS_IOP_MMHG: 17
OS_IOP_MMHG: 13
OD_IOP_MMHG: 14
OD_IOP_MMHG: 12

## 2024-12-16 ASSESSMENT — VISUAL ACUITY
OD_BCVA: 20/60+2
OS_BCVA: 20/25-2

## 2024-12-16 ASSESSMENT — REFRACTION_AUTOREFRACTION
OS_AXIS: 041
OD_SPHERE: -0.50
OS_SPHERE: -0.25
OS_CYLINDER: -1.75
OD_AXIS: 166
OD_CYLINDER: -0.50

## 2024-12-16 ASSESSMENT — CONFRONTATIONAL VISUAL FIELD TEST (CVF)
OS_FINDINGS: FULL
OD_FINDINGS: FULL

## 2024-12-16 ASSESSMENT — VASCULARIZATION: OD_VASCULARIZATION: NASAL PANNUS

## 2025-01-30 ENCOUNTER — APPOINTMENT (OUTPATIENT)
Dept: WOUND CARE | Facility: HOSPITAL | Age: 80
End: 2025-01-30
Payer: MEDICARE

## 2025-01-30 ENCOUNTER — OUTPATIENT (OUTPATIENT)
Dept: OUTPATIENT SERVICES | Facility: HOSPITAL | Age: 80
LOS: 1 days | Discharge: HOME | End: 2025-01-30
Payer: MEDICARE

## 2025-01-30 VITALS
HEIGHT: 64 IN | WEIGHT: 105 LBS | OXYGEN SATURATION: 98 % | BODY MASS INDEX: 17.93 KG/M2 | TEMPERATURE: 97.7 F | DIASTOLIC BLOOD PRESSURE: 77 MMHG | HEART RATE: 70 BPM | SYSTOLIC BLOOD PRESSURE: 145 MMHG | RESPIRATION RATE: 18 BRPM

## 2025-01-30 DIAGNOSIS — S81.812A LACERATION WITHOUT FOREIGN BODY, LEFT LOWER LEG, INITIAL ENCOUNTER: ICD-10-CM

## 2025-01-30 DIAGNOSIS — L97.801 NON-PRESSURE CHRONIC ULCER OF OTHER PART OF UNSPECIFIED LOWER LEG LIMITED TO BREAKDOWN OF SKIN: ICD-10-CM

## 2025-01-30 DIAGNOSIS — M70.61 TROCHANTERIC BURSITIS, RIGHT HIP: ICD-10-CM

## 2025-01-30 DIAGNOSIS — S81.812A LACERATION W/OUT FOREIGN BODY, LEFT LOWER LEG, INITIAL ENCOUNTER: ICD-10-CM

## 2025-01-30 DIAGNOSIS — M17.12 UNILATERAL PRIMARY OSTEOARTHRITIS, LEFT KNEE: ICD-10-CM

## 2025-01-30 DIAGNOSIS — M17.11 UNILATERAL PRIMARY OSTEOARTHRITIS, RIGHT KNEE: ICD-10-CM
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
## 2025-01-30 DIAGNOSIS — I10 ESSENTIAL (PRIMARY) HYPERTENSION: ICD-10-CM
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
## 2025-01-30 PROCEDURE — 99203 OFFICE O/P NEW LOW 30 MIN: CPT

## 2025-01-30 PROCEDURE — G0463: CPT

## 2025-02-07 ENCOUNTER — OUTPATIENT (OUTPATIENT)
Dept: OUTPATIENT SERVICES | Facility: HOSPITAL | Age: 80
LOS: 1 days | Discharge: HOME | End: 2025-02-07
Payer: MEDICARE

## 2025-02-07 ENCOUNTER — APPOINTMENT (OUTPATIENT)
Dept: WOUND CARE | Facility: HOSPITAL | Age: 80
End: 2025-02-07

## 2025-02-07 VITALS
TEMPERATURE: 97.7 F | HEIGHT: 64 IN | WEIGHT: 105 LBS | RESPIRATION RATE: 18 BRPM | HEART RATE: 73 BPM | BODY MASS INDEX: 17.93 KG/M2 | OXYGEN SATURATION: 99 % | DIASTOLIC BLOOD PRESSURE: 74 MMHG | SYSTOLIC BLOOD PRESSURE: 134 MMHG

## 2025-02-07 DIAGNOSIS — S81.812D LACERATION W/OUT FOREIGN BODY, LEFT LOWER LEG, SUBSEQUENT ENCOUNTER: ICD-10-CM

## 2025-02-07 DIAGNOSIS — L97.801 NON-PRESSURE CHRONIC ULCER OF OTHER PART OF UNSPECIFIED LOWER LEG LIMITED TO BREAKDOWN OF SKIN: ICD-10-CM

## 2025-02-07 PROCEDURE — G0463: CPT

## 2025-02-07 PROCEDURE — 99213 OFFICE O/P EST LOW 20 MIN: CPT

## 2025-02-09 DIAGNOSIS — M17.12 UNILATERAL PRIMARY OSTEOARTHRITIS, LEFT KNEE: ICD-10-CM

## 2025-02-09 DIAGNOSIS — M17.11 UNILATERAL PRIMARY OSTEOARTHRITIS, RIGHT KNEE: ICD-10-CM
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
## 2025-02-09 DIAGNOSIS — S81.812D LACERATION WITHOUT FOREIGN BODY, LEFT LOWER LEG, SUBSEQUENT ENCOUNTER: ICD-10-CM

## 2025-02-09 DIAGNOSIS — M70.61 TROCHANTERIC BURSITIS, RIGHT HIP: ICD-10-CM

## 2025-02-09 DIAGNOSIS — I10 ESSENTIAL (PRIMARY) HYPERTENSION: ICD-10-CM
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
## 2025-02-09 DIAGNOSIS — Z82.49 FAMILY HISTORY OF ISCHEMIC HEART DISEASE AND OTHER DISEASES OF THE CIRCULATORY SYSTEM: ICD-10-CM

## 2025-02-09 DIAGNOSIS — Z79.899 OTHER LONG TERM (CURRENT) DRUG THERAPY: ICD-10-CM

## 2025-02-22 ENCOUNTER — APPOINTMENT (OUTPATIENT)
Dept: WOUND CARE | Facility: HOSPITAL | Age: 80
End: 2025-02-22

## 2025-02-22 ENCOUNTER — OUTPATIENT (OUTPATIENT)
Dept: OUTPATIENT SERVICES | Facility: HOSPITAL | Age: 80
LOS: 1 days | Discharge: HOME | End: 2025-02-22
Payer: MEDICARE

## 2025-02-22 VITALS
BODY MASS INDEX: 17.93 KG/M2 | TEMPERATURE: 98 F | WEIGHT: 105 LBS | HEIGHT: 64 IN | OXYGEN SATURATION: 95 % | RESPIRATION RATE: 18 BRPM | SYSTOLIC BLOOD PRESSURE: 142 MMHG | DIASTOLIC BLOOD PRESSURE: 84 MMHG | HEART RATE: 68 BPM

## 2025-02-22 DIAGNOSIS — S81.812A LACERATION WITHOUT FOREIGN BODY, LEFT LOWER LEG, INITIAL ENCOUNTER: ICD-10-CM
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
## 2025-02-22 DIAGNOSIS — L97.801 NON-PRESSURE CHRONIC ULCER OF OTHER PART OF UNSPECIFIED LOWER LEG LIMITED TO BREAKDOWN OF SKIN: ICD-10-CM

## 2025-02-22 DIAGNOSIS — M17.12 UNILATERAL PRIMARY OSTEOARTHRITIS, LEFT KNEE: ICD-10-CM
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
## 2025-02-22 DIAGNOSIS — M17.11 UNILATERAL PRIMARY OSTEOARTHRITIS, RIGHT KNEE: ICD-10-CM

## 2025-02-22 DIAGNOSIS — M70.61 TROCHANTERIC BURSITIS, RIGHT HIP: ICD-10-CM
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
## 2025-02-22 DIAGNOSIS — Z79.899 OTHER LONG TERM (CURRENT) DRUG THERAPY: ICD-10-CM
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
## 2025-02-22 DIAGNOSIS — I10 ESSENTIAL (PRIMARY) HYPERTENSION: ICD-10-CM

## 2025-02-22 PROCEDURE — 97602 WOUND(S) CARE NON-SELECTIVE: CPT

## 2025-02-22 PROCEDURE — 99213 OFFICE O/P EST LOW 20 MIN: CPT

## 2025-02-22 RX ORDER — COLLAGENASE SANTYL 250 [ARB'U]/G
250 OINTMENT TOPICAL DAILY
Qty: 1 | Refills: 2 | Status: ACTIVE | COMMUNITY
Start: 2025-02-22 | End: 1900-01-01

## 2025-02-28 ENCOUNTER — OUTPATIENT (OUTPATIENT)
Dept: OUTPATIENT SERVICES | Facility: HOSPITAL | Age: 80
LOS: 1 days | Discharge: HOME | End: 2025-02-28
Payer: MEDICARE

## 2025-02-28 ENCOUNTER — APPOINTMENT (OUTPATIENT)
Dept: WOUND CARE | Facility: HOSPITAL | Age: 80
End: 2025-02-28
Payer: MEDICARE

## 2025-02-28 DIAGNOSIS — S81.812D LACERATION W/OUT FOREIGN BODY, LEFT LOWER LEG, SUBSEQUENT ENCOUNTER: ICD-10-CM

## 2025-02-28 DIAGNOSIS — L97.801 NON-PRESSURE CHRONIC ULCER OF OTHER PART OF UNSPECIFIED LOWER LEG LIMITED TO BREAKDOWN OF SKIN: ICD-10-CM

## 2025-02-28 PROCEDURE — 97602 WOUND(S) CARE NON-SELECTIVE: CPT

## 2025-02-28 PROCEDURE — 99213 OFFICE O/P EST LOW 20 MIN: CPT

## 2025-02-28 PROCEDURE — G0463: CPT

## 2025-03-02 DIAGNOSIS — Z82.49 FAMILY HISTORY OF ISCHEMIC HEART DISEASE AND OTHER DISEASES OF THE CIRCULATORY SYSTEM: ICD-10-CM
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
## 2025-03-02 DIAGNOSIS — I10 ESSENTIAL (PRIMARY) HYPERTENSION: ICD-10-CM

## 2025-03-02 DIAGNOSIS — M17.12 UNILATERAL PRIMARY OSTEOARTHRITIS, LEFT KNEE: ICD-10-CM
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
## 2025-03-02 DIAGNOSIS — M70.61 TROCHANTERIC BURSITIS, RIGHT HIP: ICD-10-CM

## 2025-03-02 DIAGNOSIS — Z79.899 OTHER LONG TERM (CURRENT) DRUG THERAPY: ICD-10-CM

## 2025-03-02 DIAGNOSIS — S81.812D LACERATION WITHOUT FOREIGN BODY, LEFT LOWER LEG, SUBSEQUENT ENCOUNTER: ICD-10-CM

## 2025-03-02 DIAGNOSIS — M17.11 UNILATERAL PRIMARY OSTEOARTHRITIS, RIGHT KNEE: ICD-10-CM
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
## 2025-03-18 ENCOUNTER — APPOINTMENT (OUTPATIENT)
Dept: WOUND CARE | Facility: HOSPITAL | Age: 80
End: 2025-03-18

## 2025-04-21 ENCOUNTER — APPOINTMENT (OUTPATIENT)
Dept: ORTHOPEDIC SURGERY | Facility: CLINIC | Age: 80
End: 2025-04-21

## 2025-04-21 DIAGNOSIS — M54.50 LOW BACK PAIN, UNSPECIFIED: ICD-10-CM

## 2025-04-21 PROCEDURE — 99214 OFFICE O/P EST MOD 30 MIN: CPT | Mod: 24,25

## 2025-04-21 PROCEDURE — 20610 DRAIN/INJ JOINT/BURSA W/O US: CPT | Mod: RT

## 2025-08-11 ENCOUNTER — APPOINTMENT (OUTPATIENT)
Dept: ORTHOPEDIC SURGERY | Facility: CLINIC | Age: 80
End: 2025-08-11
Payer: MEDICARE

## 2025-08-11 VITALS — DIASTOLIC BLOOD PRESSURE: 68 MMHG | OXYGEN SATURATION: 91 % | HEART RATE: 69 BPM | SYSTOLIC BLOOD PRESSURE: 107 MMHG

## 2025-08-11 DIAGNOSIS — M54.12 RADICULOPATHY, CERVICAL REGION: ICD-10-CM

## 2025-08-11 DIAGNOSIS — M19.019 PRIMARY OSTEOARTHRITIS, UNSPECIFIED SHOULDER: ICD-10-CM

## 2025-08-11 PROCEDURE — 99213 OFFICE O/P EST LOW 20 MIN: CPT

## 2025-08-11 PROCEDURE — 73030 X-RAY EXAM OF SHOULDER: CPT | Mod: LT

## 2025-08-11 PROCEDURE — 72040 X-RAY EXAM NECK SPINE 2-3 VW: CPT

## 2025-08-18 ENCOUNTER — APPOINTMENT (OUTPATIENT)
Dept: ORTHOPEDIC SURGERY | Facility: CLINIC | Age: 80
End: 2025-08-18

## 2025-09-17 ENCOUNTER — APPOINTMENT (OUTPATIENT)
Dept: ORTHOPEDIC SURGERY | Facility: CLINIC | Age: 80
End: 2025-09-17
Payer: MEDICARE

## 2025-09-17 VITALS
HEIGHT: 64 IN | HEART RATE: 64 BPM | DIASTOLIC BLOOD PRESSURE: 71 MMHG | SYSTOLIC BLOOD PRESSURE: 120 MMHG | BODY MASS INDEX: 17.93 KG/M2 | WEIGHT: 105 LBS | RESPIRATION RATE: 17 BRPM | OXYGEN SATURATION: 88 %

## 2025-09-17 DIAGNOSIS — M54.12 RADICULOPATHY, CERVICAL REGION: ICD-10-CM

## 2025-09-17 PROCEDURE — 99214 OFFICE O/P EST MOD 30 MIN: CPT | Mod: 25

## 2025-09-17 PROCEDURE — 20553 NJX 1/MLT TRIGGER POINTS 3/>: CPT
